# Patient Record
Sex: MALE | Race: WHITE | HISPANIC OR LATINO | Employment: FULL TIME | URBAN - METROPOLITAN AREA
[De-identification: names, ages, dates, MRNs, and addresses within clinical notes are randomized per-mention and may not be internally consistent; named-entity substitution may affect disease eponyms.]

---

## 2023-06-13 ENCOUNTER — OFFICE VISIT (OUTPATIENT)
Dept: PRIMARY CARE | Facility: CLINIC | Age: 38
End: 2023-06-13
Payer: COMMERCIAL

## 2023-06-13 ENCOUNTER — LAB (OUTPATIENT)
Dept: LAB | Facility: LAB | Age: 38
End: 2023-06-13
Payer: COMMERCIAL

## 2023-06-13 VITALS
WEIGHT: 216.6 LBS | OXYGEN SATURATION: 98 % | SYSTOLIC BLOOD PRESSURE: 110 MMHG | BODY MASS INDEX: 25.58 KG/M2 | HEIGHT: 77 IN | DIASTOLIC BLOOD PRESSURE: 68 MMHG | HEART RATE: 55 BPM

## 2023-06-13 DIAGNOSIS — Z00.00 ANNUAL PHYSICAL EXAM: ICD-10-CM

## 2023-06-13 DIAGNOSIS — Z00.00 ANNUAL PHYSICAL EXAM: Primary | ICD-10-CM

## 2023-06-13 LAB
CHOLESTEROL (MG/DL) IN SER/PLAS: 169 MG/DL (ref 0–199)
CHOLESTEROL IN HDL (MG/DL) IN SER/PLAS: 46.6 MG/DL
CHOLESTEROL/HDL RATIO: 3.6
LDL: 109 MG/DL (ref 0–99)
TRIGLYCERIDE (MG/DL) IN SER/PLAS: 65 MG/DL (ref 0–149)
VLDL: 13 MG/DL (ref 0–40)

## 2023-06-13 PROCEDURE — 80061 LIPID PANEL: CPT

## 2023-06-13 PROCEDURE — 36415 COLL VENOUS BLD VENIPUNCTURE: CPT

## 2023-06-13 PROCEDURE — 99395 PREV VISIT EST AGE 18-39: CPT | Performed by: FAMILY MEDICINE

## 2023-06-13 PROCEDURE — 1036F TOBACCO NON-USER: CPT | Performed by: FAMILY MEDICINE

## 2023-06-13 ASSESSMENT — PAIN SCALES - GENERAL: PAINLEVEL: 0-NO PAIN

## 2023-06-13 NOTE — PROGRESS NOTES
"Subjective   Patient ID: David Cummins is a 37 y.o. male who presents for Annual Exam (Annual physical exam, pt is fasting. ).    HPI patient doing well.  He has been running marathons.  His wife is pregnant with their second child.  He admits it has been rough as his first child does not sleep well.  Patient younger brother recently had a heart attack.  Currently but is due to an aberrant coronary artery.    Review of Systems  Cardiovascular: no chest pain, no edema, no palpitations, and no syncope.   Respiratory: no cough,no shortness of breath, and no wheezing  Gastrointestinal: no abdominal pain, nausea, vomiting or blood in stools  Genitourinary: no dysuria or hematuria  Objective   /68 (BP Location: Left arm, Patient Position: Sitting, BP Cuff Size: Adult)   Pulse 55   Ht 1.956 m (6' 5\")   Wt 98.2 kg (216 lb 9.6 oz)   SpO2 98%   BMI 25.69 kg/m²     Physical Exam  General: Alert, pleasant and in no acute distress  HEENT: Extraocular motor intact, PERRLA,  Neck: supple, nontender, no palpable or enlarged nodes, no thyromegaly  Heart: Regular rate and rhythm, no murmurs  Lungs: Clear to auscultation with good air exchange  Chest- symmetric, nontender  Abdomen: Soft, nontender, no palpable mass or organomegaly  Lower extremities: No edema  Skin: No atypical rashes or lesions  Assessment/Plan   Problem List Items Addressed This Visit    None  Visit Diagnoses       Annual physical exam    -  Primary    Relevant Orders    Lipid Panel        Patient doing well.  He is very fit.  We will check a lipid panel.  Patient has a family history of heart disease.  He underwent CT cardiac score and CRP last year which were normal.  Lipids revealed a high HDL and low LDL.  Patient had a abnormality in his stomach on the CT cardiac score.  He saw GI.  They recommend that it would be good to get an endoscopy.  He is going to monitor how things go before he decides.       "

## 2023-06-15 ENCOUNTER — TELEPHONE (OUTPATIENT)
Dept: PRIMARY CARE | Facility: CLINIC | Age: 38
End: 2023-06-15
Payer: COMMERCIAL

## 2023-06-15 NOTE — TELEPHONE ENCOUNTER
----- Message from Ron Abdul DO sent at 6/14/2023  8:04 PM EDT -----  Please let patient know that his cholesterol was excellent at 169.  ----- Message -----  From: Lab, Background User  Sent: 6/13/2023   3:47 PM EDT  To: Ron Adbul DO

## 2023-12-07 ENCOUNTER — TELEPHONE (OUTPATIENT)
Dept: PRIMARY CARE | Facility: CLINIC | Age: 38
End: 2023-12-07
Payer: COMMERCIAL

## 2023-12-07 DIAGNOSIS — J01.90 ACUTE SINUSITIS, RECURRENCE NOT SPECIFIED, UNSPECIFIED LOCATION: Primary | ICD-10-CM

## 2023-12-07 RX ORDER — AMOXICILLIN 500 MG/1
500 CAPSULE ORAL 3 TIMES DAILY
Qty: 30 CAPSULE | Refills: 0 | Status: SHIPPED | OUTPATIENT
Start: 2023-12-07 | End: 2023-12-17

## 2023-12-07 NOTE — TELEPHONE ENCOUNTER
David believes he has a sinus infection, he has had it for about 3 days now and wants to know if you are able to send in ABX to help him out? [facility]  He mentioned his daughter was sick and now he is sick

## 2024-06-05 ENCOUNTER — OFFICE VISIT (OUTPATIENT)
Dept: PRIMARY CARE | Facility: CLINIC | Age: 39
End: 2024-06-05
Payer: COMMERCIAL

## 2024-06-05 VITALS
SYSTOLIC BLOOD PRESSURE: 118 MMHG | DIASTOLIC BLOOD PRESSURE: 68 MMHG | HEART RATE: 102 BPM | TEMPERATURE: 97.8 F | WEIGHT: 217.8 LBS | BODY MASS INDEX: 25.83 KG/M2 | OXYGEN SATURATION: 96 %

## 2024-06-05 DIAGNOSIS — S39.012A ACUTE MYOFASCIAL STRAIN OF LUMBAR REGION, INITIAL ENCOUNTER: Primary | ICD-10-CM

## 2024-06-05 PROCEDURE — 98925 OSTEOPATH MANJ 1-2 REGIONS: CPT | Performed by: FAMILY MEDICINE

## 2024-06-05 PROCEDURE — 1036F TOBACCO NON-USER: CPT | Performed by: FAMILY MEDICINE

## 2024-06-05 PROCEDURE — 99213 OFFICE O/P EST LOW 20 MIN: CPT | Performed by: FAMILY MEDICINE

## 2024-06-05 RX ORDER — CYCLOBENZAPRINE HCL 10 MG
10 TABLET ORAL 3 TIMES DAILY PRN
Qty: 30 TABLET | Refills: 0 | Status: SHIPPED | OUTPATIENT
Start: 2024-06-05 | End: 2024-08-04

## 2024-06-05 ASSESSMENT — PAIN SCALES - GENERAL: PAINLEVEL: 9

## 2024-06-05 ASSESSMENT — ENCOUNTER SYMPTOMS: DEPRESSION: 0

## 2024-06-05 NOTE — PROGRESS NOTES
Subjective   Patient ID: David Cummins is a 38 y.o. male who presents for Back Pain (Ongoing back pain after weight lifting 2 days ago. ).    HPI   Began 3 days ago when working out at gym.  Took it easy and wass fine the rest of day.    No radicular symptoms. Pain radiates to upper buttocks on left  Review of Systems    Objective   /68 (BP Location: Right arm, Patient Position: Sitting, BP Cuff Size: Adult)   Pulse 102   Temp 36.6 °C (97.8 °F) (Temporal)   Wt 98.8 kg (217 lb 12.8 oz)   SpO2 96%   BMI 25.83 kg/m²     Physical Exam  Alert, pleasant and in no acute distress.  Musculoskeletal: Patient with slight forward hypertension questionnaire.  Flattening of the lumbar lordosis.  Mild to moderate diffuse loss of range of motion through the thoracic and lumbar spine.  Assessment/Plan   Problem List Items Addressed This Visit    None  Visit Diagnoses         Codes    Acute myofascial strain of lumbar region, initial encounter    -  Primary S39.012A    Relevant Medications    cyclobenzaprine (Flexeril) 10 mg tablet        Patient here with an acute lumbar strain.  Osteopathic manipulative therapy verbal consent.  HVLA technique to the thoracic and lumbar spine with moderate results.  Discussed importance of therapeutic exercises.  Patient with good understanding.  Advised patient to continue ibuprofen along with cyclobenzaprine prescription I provided.  Patient will follow-up in the next 3 to 5 days if not improving.

## 2025-01-03 ENCOUNTER — APPOINTMENT (OUTPATIENT)
Dept: PRIMARY CARE | Facility: CLINIC | Age: 40
End: 2025-01-03
Payer: COMMERCIAL

## 2025-01-10 ENCOUNTER — APPOINTMENT (OUTPATIENT)
Dept: PRIMARY CARE | Facility: CLINIC | Age: 40
End: 2025-01-10
Payer: COMMERCIAL

## 2025-01-17 ENCOUNTER — APPOINTMENT (OUTPATIENT)
Dept: PRIMARY CARE | Facility: CLINIC | Age: 40
End: 2025-01-17
Payer: COMMERCIAL

## 2025-01-23 ENCOUNTER — APPOINTMENT (OUTPATIENT)
Dept: PRIMARY CARE | Facility: CLINIC | Age: 40
End: 2025-01-23
Payer: COMMERCIAL

## 2025-01-23 VITALS
WEIGHT: 223 LBS | HEIGHT: 76 IN | TEMPERATURE: 97.7 F | BODY MASS INDEX: 27.16 KG/M2 | OXYGEN SATURATION: 98 % | DIASTOLIC BLOOD PRESSURE: 76 MMHG | SYSTOLIC BLOOD PRESSURE: 118 MMHG | HEART RATE: 75 BPM

## 2025-01-23 DIAGNOSIS — Z82.49 FAMILY HISTORY OF CARDIAC DISORDER: ICD-10-CM

## 2025-01-23 DIAGNOSIS — Z00.00 PHYSICAL EXAM, ANNUAL: Primary | ICD-10-CM

## 2025-01-23 PROCEDURE — 99395 PREV VISIT EST AGE 18-39: CPT | Performed by: NURSE PRACTITIONER

## 2025-01-23 PROCEDURE — 80061 LIPID PANEL: CPT

## 2025-01-23 PROCEDURE — 3008F BODY MASS INDEX DOCD: CPT | Performed by: NURSE PRACTITIONER

## 2025-01-23 PROCEDURE — 82306 VITAMIN D 25 HYDROXY: CPT

## 2025-01-23 PROCEDURE — 83036 HEMOGLOBIN GLYCOSYLATED A1C: CPT

## 2025-01-23 PROCEDURE — 93000 ELECTROCARDIOGRAM COMPLETE: CPT | Performed by: NURSE PRACTITIONER

## 2025-01-23 PROCEDURE — 84443 ASSAY THYROID STIM HORMONE: CPT

## 2025-01-23 PROCEDURE — 80053 COMPREHEN METABOLIC PANEL: CPT

## 2025-01-23 PROCEDURE — 1036F TOBACCO NON-USER: CPT | Performed by: NURSE PRACTITIONER

## 2025-01-23 PROCEDURE — 85025 COMPLETE CBC W/AUTO DIFF WBC: CPT

## 2025-01-23 ASSESSMENT — PATIENT HEALTH QUESTIONNAIRE - PHQ9
1. LITTLE INTEREST OR PLEASURE IN DOING THINGS: NEARLY EVERY DAY
2. FEELING DOWN, DEPRESSED OR HOPELESS: NOT AT ALL
SUM OF ALL RESPONSES TO PHQ9 QUESTIONS 1 AND 2: 3

## 2025-01-23 ASSESSMENT — ENCOUNTER SYMPTOMS
CARDIOVASCULAR NEGATIVE: 1
ALLERGIC/IMMUNOLOGIC NEGATIVE: 1
NEUROLOGICAL NEGATIVE: 1
EYES NEGATIVE: 1
GASTROINTESTINAL NEGATIVE: 1
PSYCHIATRIC NEGATIVE: 1
ENDOCRINE NEGATIVE: 1
DEPRESSION: 0
ROS SKIN COMMENTS: SEE HPI
MUSCULOSKELETAL NEGATIVE: 1
CONSTITUTIONAL NEGATIVE: 1
RESPIRATORY NEGATIVE: 1
HEMATOLOGIC/LYMPHATIC NEGATIVE: 1

## 2025-01-23 ASSESSMENT — PAIN SCALES - GENERAL: PAINLEVEL_OUTOF10: 0-NO PAIN

## 2025-01-23 NOTE — PATIENT INSTRUCTIONS
Good Seeing you today.  Healthy diet and Drink plenty of water-UNLESS RESTRICTIONS.  Regular exercises.  Labs drawn today in clinic.  Please see MyChart for results.  Will notify you with abnormal results only by telephone or MyChart.  If you have any question about your labs do not hesitate to give our office a call.    If you do not have access to [employer]t our office will notify you with any abnormal results.  Please schedule any appointments with ophthalmology/dentist if you are not up-to-date.    Follow-up in 1 year sooner.   Call office any new concerns..  Notify office if they have any changes to the lipoma under your right ear.

## 2025-01-23 NOTE — PROGRESS NOTES
"Subjective   Patient ID: David Cummins is a 39 y.o. male who presents for Annual Exam (Patient is fasting. Growth on right ear.).    HPI       Previous patient of Dr. Ron Abdul presents to clinic for annual exam.      Patient denies being treated for any current medical problems currently denies taking any medications.      Patient states he has what he has been told is a lipoma under his right ear.  He states he had a growth for approximately 10 years and it is slowly growing.  He denies pain or discomfort at the site.  Just wanted the area checked.      Patient also has strong family history of cardiac problems both father and brother.  Younger brother had a heart attack a few years ago.       Patient has been followed by cardiology.  Patient had an CT calcium score June 27, 2022 calcium score of 0.  Will order twelve-lead EKG today.  Patient is without chest pain short of breath.  He is asymptomatic.      Patient states he eats a healthy diet very active he runs marathons.     Appetite normal bowel and bladder normal.      Review of Systems   Constitutional: Negative.    HENT: Negative.     Eyes: Negative.    Respiratory: Negative.     Cardiovascular: Negative.    Gastrointestinal: Negative.    Endocrine: Negative.    Genitourinary: Negative.    Musculoskeletal: Negative.    Skin: Negative.         See HPI   Allergic/Immunologic: Negative.    Neurological: Negative.    Hematological: Negative.    Psychiatric/Behavioral: Negative.         Objective   /76 (BP Location: Left arm, Patient Position: Sitting, BP Cuff Size: Adult)   Pulse 75   Temp 36.5 °C (97.7 °F) (Temporal)   Ht 1.93 m (6' 4\")   Wt 101 kg (223 lb)   SpO2 98%   BMI 27.14 kg/m²     Physical Exam  Vitals reviewed.   Constitutional:       Appearance: Normal appearance.   HENT:      Right Ear: Tympanic membrane and ear canal normal.      Left Ear: Tympanic membrane and ear canal normal.      Mouth/Throat:      Mouth: Mucous membranes " are moist.      Pharynx: Oropharynx is clear.   Eyes:      Pupils: Pupils are equal, round, and reactive to light.   Cardiovascular:      Rate and Rhythm: Normal rate and regular rhythm.   Pulmonary:      Effort: Pulmonary effort is normal.      Breath sounds: Normal breath sounds.   Abdominal:      General: Bowel sounds are normal.      Palpations: Abdomen is soft.   Musculoskeletal:         General: Normal range of motion.      Cervical back: Normal range of motion and neck supple.   Skin:     General: Skin is warm and dry.      Comments: Small soft movable growth what appears to be a lipoma under right ear lobe.   Neurological:      General: No focal deficit present.      Mental Status: He is alert and oriented to person, place, and time.   Psychiatric:         Mood and Affect: Mood normal.         Thought Content: Thought content normal.         Assessment/Plan   Diagnoses and all orders for this visit:  Physical exam, annual  -Unremarkable physical exam  -Healthy diet regular exercises  -Follow-up 1 year sooner if needed.  -     CBC and Auto Differential  -     Comprehensive Metabolic Panel  -     TSH with reflex to Free T4 if abnormal  -     Lipid Panel  -     Hemoglobin A1C  -     Vitamin D 25-Hydroxy,Total (for eval of Vitamin D levels)  Family history of cardiac disorder  -     ECG 12 lead (Clinic Performed)-sinus bradycardia rate 51 otherwise normal    Continue to monitor lipoma and refer to dermatology or general surgeon if needed.

## 2025-01-24 LAB
25(OH)D3 SERPL-MCNC: 32 NG/ML (ref 30–100)
ALBUMIN SERPL BCP-MCNC: 4.9 G/DL (ref 3.4–5)
ALP SERPL-CCNC: 32 U/L (ref 33–120)
ALT SERPL W P-5'-P-CCNC: 15 U/L (ref 10–52)
ANION GAP SERPL CALC-SCNC: 14 MMOL/L (ref 10–20)
AST SERPL W P-5'-P-CCNC: 16 U/L (ref 9–39)
BASOPHILS # BLD AUTO: 0.04 X10*3/UL (ref 0–0.1)
BASOPHILS NFR BLD AUTO: 0.7 %
BILIRUB SERPL-MCNC: 0.8 MG/DL (ref 0–1.2)
BUN SERPL-MCNC: 15 MG/DL (ref 6–23)
CALCIUM SERPL-MCNC: 10 MG/DL (ref 8.6–10.6)
CHLORIDE SERPL-SCNC: 103 MMOL/L (ref 98–107)
CHOLEST SERPL-MCNC: 187 MG/DL (ref 0–199)
CHOLESTEROL/HDL RATIO: 4.2
CO2 SERPL-SCNC: 27 MMOL/L (ref 21–32)
CREAT SERPL-MCNC: 1.06 MG/DL (ref 0.5–1.3)
EGFRCR SERPLBLD CKD-EPI 2021: >90 ML/MIN/1.73M*2
EOSINOPHIL # BLD AUTO: 0.28 X10*3/UL (ref 0–0.7)
EOSINOPHIL NFR BLD AUTO: 4.7 %
ERYTHROCYTE [DISTWIDTH] IN BLOOD BY AUTOMATED COUNT: 12.6 % (ref 11.5–14.5)
EST. AVERAGE GLUCOSE BLD GHB EST-MCNC: 108 MG/DL
GLUCOSE SERPL-MCNC: 92 MG/DL (ref 74–99)
HBA1C MFR BLD: 5.4 %
HCT VFR BLD AUTO: 43.1 % (ref 41–52)
HDLC SERPL-MCNC: 44.9 MG/DL
HGB BLD-MCNC: 13.6 G/DL (ref 13.5–17.5)
IMM GRANULOCYTES # BLD AUTO: 0 X10*3/UL (ref 0–0.7)
IMM GRANULOCYTES NFR BLD AUTO: 0 % (ref 0–0.9)
LDLC SERPL CALC-MCNC: 123 MG/DL
LYMPHOCYTES # BLD AUTO: 2.74 X10*3/UL (ref 1.2–4.8)
LYMPHOCYTES NFR BLD AUTO: 45.7 %
MCH RBC QN AUTO: 30.6 PG (ref 26–34)
MCHC RBC AUTO-ENTMCNC: 31.6 G/DL (ref 32–36)
MCV RBC AUTO: 97 FL (ref 80–100)
MONOCYTES # BLD AUTO: 0.53 X10*3/UL (ref 0.1–1)
MONOCYTES NFR BLD AUTO: 8.8 %
NEUTROPHILS # BLD AUTO: 2.41 X10*3/UL (ref 1.2–7.7)
NEUTROPHILS NFR BLD AUTO: 40.1 %
NON HDL CHOLESTEROL: 142 MG/DL (ref 0–149)
NRBC BLD-RTO: 0 /100 WBCS (ref 0–0)
PLATELET # BLD AUTO: 248 X10*3/UL (ref 150–450)
POTASSIUM SERPL-SCNC: 4.6 MMOL/L (ref 3.5–5.3)
PROT SERPL-MCNC: 7.4 G/DL (ref 6.4–8.2)
RBC # BLD AUTO: 4.45 X10*6/UL (ref 4.5–5.9)
SODIUM SERPL-SCNC: 139 MMOL/L (ref 136–145)
TRIGL SERPL-MCNC: 94 MG/DL (ref 0–149)
TSH SERPL-ACNC: 2.26 MIU/L (ref 0.44–3.98)
VLDL: 19 MG/DL (ref 0–40)
WBC # BLD AUTO: 6 X10*3/UL (ref 4.4–11.3)

## 2025-03-07 PROCEDURE — 99283 EMERGENCY DEPT VISIT LOW MDM: CPT | Performed by: STUDENT IN AN ORGANIZED HEALTH CARE EDUCATION/TRAINING PROGRAM

## 2025-03-07 ASSESSMENT — COLUMBIA-SUICIDE SEVERITY RATING SCALE - C-SSRS
1. IN THE PAST MONTH, HAVE YOU WISHED YOU WERE DEAD OR WISHED YOU COULD GO TO SLEEP AND NOT WAKE UP?: NO
6. HAVE YOU EVER DONE ANYTHING, STARTED TO DO ANYTHING, OR PREPARED TO DO ANYTHING TO END YOUR LIFE?: NO
2. HAVE YOU ACTUALLY HAD ANY THOUGHTS OF KILLING YOURSELF?: NO

## 2025-03-08 ENCOUNTER — HOSPITAL ENCOUNTER (EMERGENCY)
Facility: HOSPITAL | Age: 40
Discharge: HOME | End: 2025-03-08
Attending: STUDENT IN AN ORGANIZED HEALTH CARE EDUCATION/TRAINING PROGRAM
Payer: COMMERCIAL

## 2025-03-08 VITALS
OXYGEN SATURATION: 97 % | SYSTOLIC BLOOD PRESSURE: 128 MMHG | BODY MASS INDEX: 26.79 KG/M2 | WEIGHT: 220 LBS | HEART RATE: 62 BPM | HEIGHT: 76 IN | DIASTOLIC BLOOD PRESSURE: 86 MMHG | TEMPERATURE: 97 F | RESPIRATION RATE: 16 BRPM

## 2025-03-08 DIAGNOSIS — M79.661 PAIN IN RIGHT LOWER LEG: ICD-10-CM

## 2025-03-08 DIAGNOSIS — M21.371 RIGHT FOOT DROP: Primary | ICD-10-CM

## 2025-03-08 LAB
ALBUMIN SERPL BCP-MCNC: 4.7 G/DL (ref 3.4–5)
ALP SERPL-CCNC: 42 U/L (ref 33–120)
ALT SERPL W P-5'-P-CCNC: 12 U/L (ref 10–52)
ANION GAP SERPL CALC-SCNC: 13 MMOL/L (ref 10–20)
AST SERPL W P-5'-P-CCNC: 15 U/L (ref 9–39)
BASOPHILS # BLD AUTO: 0.04 X10*3/UL (ref 0–0.1)
BASOPHILS NFR BLD AUTO: 0.5 %
BILIRUB SERPL-MCNC: 0.4 MG/DL (ref 0–1.2)
BUN SERPL-MCNC: 16 MG/DL (ref 6–23)
CALCIUM SERPL-MCNC: 9.4 MG/DL (ref 8.6–10.3)
CHLORIDE SERPL-SCNC: 106 MMOL/L (ref 98–107)
CK SERPL-CCNC: 125 U/L (ref 0–325)
CO2 SERPL-SCNC: 23 MMOL/L (ref 21–32)
CREAT SERPL-MCNC: 0.94 MG/DL (ref 0.5–1.3)
EGFRCR SERPLBLD CKD-EPI 2021: >90 ML/MIN/1.73M*2
EOSINOPHIL # BLD AUTO: 0.2 X10*3/UL (ref 0–0.7)
EOSINOPHIL NFR BLD AUTO: 2.7 %
ERYTHROCYTE [DISTWIDTH] IN BLOOD BY AUTOMATED COUNT: 12.2 % (ref 11.5–14.5)
GLUCOSE SERPL-MCNC: 109 MG/DL (ref 74–99)
HCT VFR BLD AUTO: 38.5 % (ref 41–52)
HGB BLD-MCNC: 13.2 G/DL (ref 13.5–17.5)
IMM GRANULOCYTES # BLD AUTO: 0.02 X10*3/UL (ref 0–0.7)
IMM GRANULOCYTES NFR BLD AUTO: 0.3 % (ref 0–0.9)
LYMPHOCYTES # BLD AUTO: 2.8 X10*3/UL (ref 1.2–4.8)
LYMPHOCYTES NFR BLD AUTO: 37.5 %
MCH RBC QN AUTO: 31.5 PG (ref 26–34)
MCHC RBC AUTO-ENTMCNC: 34.3 G/DL (ref 32–36)
MCV RBC AUTO: 92 FL (ref 80–100)
MONOCYTES # BLD AUTO: 0.63 X10*3/UL (ref 0.1–1)
MONOCYTES NFR BLD AUTO: 8.4 %
NEUTROPHILS # BLD AUTO: 3.77 X10*3/UL (ref 1.2–7.7)
NEUTROPHILS NFR BLD AUTO: 50.6 %
NRBC BLD-RTO: 0 /100 WBCS (ref 0–0)
PLATELET # BLD AUTO: 229 X10*3/UL (ref 150–450)
POTASSIUM SERPL-SCNC: 4.1 MMOL/L (ref 3.5–5.3)
PROT SERPL-MCNC: 7.4 G/DL (ref 6.4–8.2)
RBC # BLD AUTO: 4.19 X10*6/UL (ref 4.5–5.9)
SODIUM SERPL-SCNC: 138 MMOL/L (ref 136–145)
WBC # BLD AUTO: 7.5 X10*3/UL (ref 4.4–11.3)

## 2025-03-08 PROCEDURE — 36415 COLL VENOUS BLD VENIPUNCTURE: CPT

## 2025-03-08 PROCEDURE — 80053 COMPREHEN METABOLIC PANEL: CPT

## 2025-03-08 PROCEDURE — 85025 COMPLETE CBC W/AUTO DIFF WBC: CPT

## 2025-03-08 PROCEDURE — 82550 ASSAY OF CK (CPK): CPT

## 2025-03-08 ASSESSMENT — LIFESTYLE VARIABLES
TOTAL SCORE: 0
HAVE PEOPLE ANNOYED YOU BY CRITICIZING YOUR DRINKING: NO
EVER FELT BAD OR GUILTY ABOUT YOUR DRINKING: NO
HAVE YOU EVER FELT YOU SHOULD CUT DOWN ON YOUR DRINKING: NO
EVER HAD A DRINK FIRST THING IN THE MORNING TO STEADY YOUR NERVES TO GET RID OF A HANGOVER: NO

## 2025-03-08 ASSESSMENT — PAIN - FUNCTIONAL ASSESSMENT: PAIN_FUNCTIONAL_ASSESSMENT: 0-10

## 2025-03-08 ASSESSMENT — PAIN SCALES - GENERAL
PAINLEVEL_OUTOF10: 3
PAINLEVEL_OUTOF10: 0 - NO PAIN

## 2025-03-08 ASSESSMENT — PAIN DESCRIPTION - LOCATION: LOCATION: LEG

## 2025-03-08 NOTE — DISCHARGE INSTRUCTIONS
You were seen in the emergency room for weakness and numbness in your right foot.  I am concerned that you may have damage in the nerve in your right leg.  I would like you to see a sports medicinedoctor as soon as possible   All of your lab work looks normal and we did not find any signs of damage to your muscles      Please follow-up either with sports medicine and or at our walk-in orthopedics clinic which is here at this hospital

## 2025-03-08 NOTE — ED PROVIDER NOTES
"History of Present Illness     History provided by: Patient  Limitations to History: None  External Records Reviewed with Brief Summary: None    HPI:  David Cummins is a 39 y.o. male with no notable PMHx presenting today for pain on the anterior compartment of his right lower leg.  He reports while in the middle of a run (he is training for a marathon) that he developed acute pain over the tibialis anterior muscle, pain is dull in nature.  At the same time he developed symptoms he describes as \"dropfoot\" in which he has had difficulty with dorsiflexion of the RLE and dragging of the right foot     States that pain was poorly controlled with Advil at home.  Tonight he took an oxycodone which was previously prescribed with some benefit for a short time.   He states that his pain has generally been worse at the end of the day after work when he is resting in a chair.  Had exacerbation of pain tonight which awoke him from sleep.  Pain is relieved by placing his feet flat on the floor  Additionally notes decreased sensation over the right hallux     Pain is not exacerbated by movement    Denies any recent illness, denies back pain, fevers, has never used injection drugs, no bowel or bladder incontinence, denies trauma to the back and the leg.       Physical Exam   Triage vitals:  T 36.8 °C (98.2 °F)  HR 71  /65  RR 16  O2 97 %        Legs: No skin changes, perhaps mild swelling over the right lower leg and the anterior compartment.  No pain on palpation, no erythema, is not indurated or warm.  Decreased strength with dorsiflexion in the RLE at the ankle compared to the left.  Plantarflexion, inversion, eversion are 5/5 and equal bilaterally    Decreased but intact sensation of the right hallux.  Normal sensation through the rest of the feet and toes bilaterally.  Capillary refill less than 2 seconds throughout  DPs intact bilaterally    No tenderness through the back or in the midline bony spine.  No step-offs " in the spine.  No pain over the hips.  Strength and sensation fully intact in the thighs and with flexion/extension of the hips and knees as well as abduction and adduction at the hips    +2 reflexes in the knees.  1 beat of clonus at the ankles bilaterally.  No Babinski bilaterally    Medical Decision Making & ED Course   Medical Decision Makin y.o. male with history and physical concerning for dropfoot, there are no signs of central spinal process or changes in any neurologic function above the knee.  He has symptoms purely motor in the tibialis anterior and purely sensory over the medial foot.  He has no other concerning signs or symptoms and has not been ill recently.  Has not had any traumatic injuries nor worn any braces or casts.  It is unclear to me how this patient developed this neurologic syndrome but appears to have a purely peripheral neuropathy which is caused dropfoot in this patient.  He will require further evaluation, I will refer patient to Ortho clinic and to sports medicine, whoever can see the patient first is ideal so that he can begin the healing process sooner.  I suspect that he may ultimately need an EMG to evaluate nerve injury and to guide recovery    Additionally he has no bowel or bladder symptoms/incontinence so I have an extremely low concern for cauda equina as patient does also not have any symptoms on the other foot    ED Course:  Diagnoses as of 25 0800   Right foot drop   Pain in right lower leg         ---     Social Determinants of Health which Significantly Impact Care: None identified     EKG Independent Interpretation: EKG not obtained    The patient was discussed with the following consultants/services: None    Independent Result Review and Interpretation: Relevant laboratory and radiographic results were reviewed and independently interpreted by myself.  As necessary, they are commented on in the ED Course.    Chronic conditions affecting the patient's care:  As documented above in MDM    Care Considerations: As documented above in MDM    Disposition   As a result of the work-up, the patient was discharged home.  he was informed of his diagnosis and instructed to come back with any concerns or worsening of condition.  he and was agreeable to the plan as discussed above.  he was given the opportunity to ask questions.  All of the patient's questions were answered.    Procedures   Procedures    This was a shared visit with an ED attending.  The patient was seen and discussed with the ED attending    Robbie Cotton MD  Emergency Medicine     Robbie Cotton MD  Resident  03/09/25 [id number]

## 2025-03-08 NOTE — ED NOTES
Examined patients right leg and calf; patients pain appears to be on lateral side of shin radiating down to ankle; patient has good pulses and no obvious swelling; patient denies trauma; patient states pain is a dull ache and is worse at night; patient is unable to dorsiflex right foot and he states that started about 3 days ago with the leg pain; patient has no other complaints     Awa Cantrell RN  03/08/25 4710

## 2025-03-27 ENCOUNTER — OFFICE VISIT (OUTPATIENT)
Dept: ORTHOPEDIC SURGERY | Facility: CLINIC | Age: 40
End: 2025-03-27
Payer: COMMERCIAL

## 2025-03-27 ENCOUNTER — HOSPITAL ENCOUNTER (OUTPATIENT)
Dept: RADIOLOGY | Facility: CLINIC | Age: 40
Discharge: HOME | End: 2025-03-27
Payer: COMMERCIAL

## 2025-03-27 DIAGNOSIS — M21.371 RIGHT FOOT DROP: Primary | ICD-10-CM

## 2025-03-27 DIAGNOSIS — M79.671 RIGHT FOOT PAIN: ICD-10-CM

## 2025-03-27 DIAGNOSIS — M79.661 PAIN IN RIGHT LOWER LEG: ICD-10-CM

## 2025-03-27 PROCEDURE — 73630 X-RAY EXAM OF FOOT: CPT | Mod: RT

## 2025-03-27 PROCEDURE — 99204 OFFICE O/P NEW MOD 45 MIN: CPT | Performed by: ORTHOPAEDIC SURGERY

## 2025-03-27 PROCEDURE — 99214 OFFICE O/P EST MOD 30 MIN: CPT | Performed by: ORTHOPAEDIC SURGERY

## 2025-03-27 PROCEDURE — 1036F TOBACCO NON-USER: CPT | Performed by: ORTHOPAEDIC SURGERY

## 2025-03-27 ASSESSMENT — PAIN SCALES - GENERAL: PAINLEVEL_OUTOF10: 3

## 2025-03-27 ASSESSMENT — PAIN - FUNCTIONAL ASSESSMENT: PAIN_FUNCTIONAL_ASSESSMENT: 0-10

## 2025-03-27 ASSESSMENT — PAIN DESCRIPTION - DESCRIPTORS: DESCRIPTORS: TINGLING;OTHER (COMMENT)

## 2025-03-27 NOTE — PROGRESS NOTES
ORTHOPAEDIC SURGERY CLINIC NOTE    Patient Name: David Cummins   MRN: [id number]    PRIMARY CARE PHYSICIAN: Lola Wilson, CHRYSTAL-KENYATTA    CHIEF COMPLAINT:  R ankle weakness, RLE pain    HPI: David Cummins is a 39 y.o. male with a PMH healthy who presents with the above chief complaint.    He is a healthy active male.  He has been training for marathon over the past year.  He has done 3 prior marathons.  3 weeks ago he noticed pain along the anterior lateral distal leg.  He also noticed dropfoot.  As result of this he presented to the emergency department.  He does note a history approximately 1 year ago low back injury but denies radicular pain from that episode.  He did some physical therapy related to that.  He has not done any physical therapy related to his right foot.  He has never had an injection.    No diabetes  No blood thinners    PAST MEDICAL HISTORY:    History reviewed. No pertinent past medical history.    PAST SURGICAL HISTORY:   Past Surgical History:   Procedure Laterality Date    OTHER SURGICAL HISTORY  04/14/2016    History Of Prior Surgery       FAMILY HISTORY:    Not contributory to surgical management of this patient   No family history on file.    SOCIAL HISTORY:    Social History     Occupational History    Not on file   Tobacco Use    Smoking status: Never    Smokeless tobacco: Never   Vaping Use    Vaping status: Never Used   Substance and Sexual Activity    Alcohol use: Yes    Drug use: Never    Sexual activity: Not on file       MEDICATIONS:          ALLERGIES: No Known Allergies    COMPLETE REVIEW OF SYSTEMS:    Skin: negative    Eyes: negative review of symptoms    Ears/Nose/Throat: negative    Respiratory: negative symptoms (no cough, hemoptysis, SOB, STOLL, PND, wheezing)    Cardiovascular: negative symptoms (No CP/Pressure/Tightness, palpitations, orthopnea, PND, SOB, STOLL, edema, HA or vision change)    Gastrointestinal: negative symptoms (no abdominal pain, anorexia, n/v,  indigestion, constipation, or diarrhea)      Neurologic: negative symptoms (no syncope, seizures, weakness, gait problems, numbness, burning pain, tremors, or memory loss)    negative (no arthritic pain, no joint swelling, no muscle weakness)    Psychiatric: negative (no sleep disturbance, anxiety, memory loss, disorientation, inattention,  feelings of depression)    Hematologic/Lymphatic/Immunologic: negative (no anemia, bleeding, bruising)       PHYSICAL EXAM:     OBJECTIVE:     GEN: AOx3  CV: RRR to palpation  PULM: non-labored breathing  MSK:  Skin intact  +TTP anterolateral lower leg  +TTP around fibular head  3/5 DF and EHL  4/5 inversion and eversion  5/5 PF  TA jennifer to DF without palpable tear  +tinel DPN  Neg tinel SPN  2+ DP pulse    DATA:   Radiology: XR R foot independently reviewed and interpreted demonstrates no acute fracture or dislocation       ASSESSMENT: David Cummins is a 39 y.o. male with R drop foot, c/f peroneal nerve injury    PLAN:    - EMG ordered  - AFO rx provided  - Weightbearing: WBAT RLE  - Follow up: after EMG    Chi Prakash MD  Orthopaedic Surgery PGY-3

## 2025-03-27 NOTE — PROGRESS NOTES
Patient was reviewed and discussed with SCOTTIE and/or orthopedic resident.  Patient was seen and evaluated in conjunction with SCOTTIE and/or orthopedic resident. Findings and treatment plan were discussed and approved by myself, Dr. Chow.    Exam: ?  Tinel's over DPN.  Tender over fibular head and proximal anterior compartment.  Intact anterior tib and EHL function but unable to dorsiflex against gravity.  4/5 peroneals to resisted eversion on right.    I personally reviewed the following radiographic exams: Right foot unremarkable.    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    Plan: Discussed nonoperative and operative options in detail.   Risk and benefits discussed in detail. All questions answered today.  Recovery timeline and expectations discussed in detail.  Recommend obtaining AFO for mechanical support.  Needs EMG/nerve conduction study to evaluate function of the peroneal nerve and source of dysfunction.  Will consider advanced imaging after reviewing EMG findings.

## 2025-04-03 ENCOUNTER — APPOINTMENT (OUTPATIENT)
Dept: ORTHOPEDIC SURGERY | Facility: CLINIC | Age: 40
End: 2025-04-03
Payer: COMMERCIAL

## 2025-04-03 DIAGNOSIS — M21.371 RIGHT FOOT DROP: ICD-10-CM

## 2025-04-03 DIAGNOSIS — G57.31 PERONEAL NEUROPATHY, RIGHT: Primary | ICD-10-CM

## 2025-04-03 RX ORDER — GABAPENTIN 300 MG/1
CAPSULE ORAL
Qty: 90 CAPSULE | Refills: 3 | Status: SHIPPED | OUTPATIENT
Start: 2025-04-03

## 2025-04-03 ASSESSMENT — PAIN - FUNCTIONAL ASSESSMENT: PAIN_FUNCTIONAL_ASSESSMENT: 0-10

## 2025-04-03 NOTE — PROGRESS NOTES
PM&R  / Ortho clinic eval:    IMPRESSION:    Right common peroneal neuropathy, possibly from local trauma rolling?  Or exertional compartment syndrome?    This really does not look like a spine problem    RECOMMENDATIONS:  -Agree with EMG and custom AFO as ordered  -Meantime he can try [place] - Foot up type strap/brace  -Advised to limit physical activity is much as possible, he could crosstraining with swimming or upper body lifting, maybe even bicycling and use caution  -Prescribed gabapentin up to 300 3 times daily  f/u next available/4 weeks, meanwhile I will watch for EMG results and may discuss with one of our running medicine specialists about exertional pressure managements have him see have him see Hernesto Kelsey in neurosurgery also.     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  Here for  right foot drop since first few days of march - was sore after a run (3rd day in a row) so did some therapeutic rolling (hard plastic rolling device ) over that area - felt good.  Then normal long runs ~6miles rest of that week then suddening very severe pain 2 miles in to run and had to walk home, Pain got even worse and had to go to ED 2 days later.  He really was just having pain then, does not recall having very tense firm pressure in the leg, nor other associated symptoms at that time.  He had a second episode a week or 2 later where he forgot his phone in the car when he was getting on a plane and had to run all  the way back to the parking area and then back into the airport and pain was excruciating so benefit like he was get a pass out on the plane.    The pain is still moderate and crampy, feels like occasional shooting, stinging,  increases with nothing in particular and keeps him awake a night and is relieved by nothing.    Pain scale  6-7/10 on average and 10/10 worst pain in past week.      Played wide  at college, does not recall any major trauma to the back or legs, did have severe low back pain last May after the [employer] but was able to work through it, none recent.  There is no pain that shoots down from the back.    Patient reports no fevers, chills, sweats, night pain, weight loss or cancer history .    Pertinent Physical Exam:  MSK: Lumbar range of motion is mildly reduced in extension but otherwise full and nonprovocative facet loading/foraminal compression maneuvers negative straight leg raise negative hip provocative testing range of motion is unremarkable.  Right knee exam is normal full range of motion no effusion, no tenderness over fibular head  Right shin does have tenderness over the proximal aspect of the tibialis anterior and somewhat diffusely through the anterior compartment especially distal/musculotendinous junction below skilled nursing down the shin over tib anterior.  Compartment does feel soft and supple  Neuro: 3/5 strength in right ankle dorsiflexion and EHL, 4/5 eversion, normal inversion and the rest of the lower extremity is strong.  Sensation is altered but present to light touch in the anterior lateral leg and dorsal foot including the first webspace.    SUPPORTING DOCUMENTATION (remaining history, exam, other findings):  Work-up reviewed - this has included Rt foot xrays neg    Treatment has included none/rest    See above for Assessment and Plan

## 2025-04-03 NOTE — LETTER
April 3, 2025     Lola Wilson, APRN-CNP  [address] [facility]  [facility] [facility] [address]    Patient: David Cummins   YOB: 1985   Date of Visit: 4/3/2025       Dear Dr. Lola Wilson, APRN-CNP:    Thank you for referring David Cummins to me for evaluation. Below are my notes for this consultation.  If you have questions, please do not hesitate to call me. I look forward to following your patient along with you.       Sincerely,     Ron Marte MD      CC: Robbie Cotton MD  ______________________________________________________________________________________    PM&R  / Ortho clinic eval:    IMPRESSION:    Right common peroneal neuropathy, possibly from local trauma rolling?  Or exertional compartment syndrome?    This really does not look like a spine problem    RECOMMENDATIONS:  -Agree with EMG and custom AFO as ordered  -Meantime he can try Ossur - Foot up type strap/brace  -Advised to limit physical activity is much as possible, he could crosstraining with swimming or upper body lifting, maybe even bicycling and use caution  -Prescribed gabapentin up to 300 3 times daily  f/u next available/4 weeks, meanwhile I will watch for EMG results and may discuss with one of our running medicine specialists about exertional pressure managements have him see have him see Hernesto Kelsey in neurosurgery also.     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  Here for  right foot drop  since first few days of march - was sore after a run (3rd day in a row) so did some therapeutic rolling (hard plastic rolling device ) over that area - felt good.  Then normal long runs ~6miles rest of that week then suddening very severe pain 2 miles in to run and had to walk home, Pain got even worse and had to go to ED 2 days later.  He really was just having pain then, does not recall having very tense firm pressure in the leg, nor other associated symptoms at that time.  He had a second episode a week or 2 later where he forgot his phone in the car when he was getting on a plane and had to run all the way back to the parking area and then back into the airport and pain was excruciating so benefit like he was get a pass out on the plane.    The pain is still moderate and crampy, feels like occasional shooting, stinging,  increases with nothing in particular and keeps him awake a night and is relieved by nothing.    Pain scale  6-7/10 on average and 10/10 worst pain in past week.      Played wide  at college, does not recall any major trauma to the back or legs, did have severe low back pain last May after the [employer] marathon but was able to work through it, none recent.  There is no pain that shoots down from the back.    Patient reports no fevers, chills, sweats, night pain, weight loss or cancer history .    Pertinent Physical Exam:  MSK: Lumbar range of motion is mildly reduced in extension but otherwise full and nonprovocative facet loading/foraminal compression maneuvers negative straight leg raise negative hip provocative testing range of motion is unremarkable.  Right knee exam is normal full range of motion no effusion, no tenderness over fibular head  Right shin does have tenderness over the proximal aspect of the tibialis anterior and somewhat diffusely through the anterior compartment especially distal/musculotendinous junction below FDC down the shin over tib anterior.  Compartment does  feel soft and supple  Neuro: 3/5 strength in right ankle dorsiflexion and EHL, 4/5 eversion, normal inversion and the rest of the lower extremity is strong.  Sensation is altered but present to light touch in the anterior lateral leg and dorsal foot including the first webspace.    SUPPORTING DOCUMENTATION (remaining history, exam, other findings):  Work-up reviewed - this has included Rt foot xrays neg    Treatment has included none/rest    See above for Assessment and Plan

## 2025-04-07 ENCOUNTER — HOSPITAL ENCOUNTER (OUTPATIENT)
Dept: NEUROLOGY | Facility: CLINIC | Age: 40
Discharge: HOME | End: 2025-04-07
Payer: COMMERCIAL

## 2025-04-07 DIAGNOSIS — M21.371 RIGHT FOOT DROP: ICD-10-CM

## 2025-04-07 PROCEDURE — 95886 MUSC TEST DONE W/N TEST COMP: CPT | Performed by: PSYCHIATRY & NEUROLOGY

## 2025-04-07 PROCEDURE — 95911 NRV CNDJ TEST 9-10 STUDIES: CPT | Performed by: PSYCHIATRY & NEUROLOGY

## 2025-05-01 ENCOUNTER — HOSPITAL ENCOUNTER (OUTPATIENT)
Dept: RADIOLOGY | Facility: EXTERNAL LOCATION | Age: 40
Discharge: HOME | End: 2025-05-01

## 2025-05-01 ENCOUNTER — APPOINTMENT (OUTPATIENT)
Dept: ORTHOPEDIC SURGERY | Facility: CLINIC | Age: 40
End: 2025-05-01
Payer: COMMERCIAL

## 2025-05-01 DIAGNOSIS — G57.31 PERONEAL NEUROPATHY, RIGHT: Primary | ICD-10-CM

## 2025-05-01 PROCEDURE — 64450 NJX AA&/STRD OTHER PN/BRANCH: CPT | Performed by: PHYSICAL MEDICINE & REHABILITATION

## 2025-05-01 PROCEDURE — 99214 OFFICE O/P EST MOD 30 MIN: CPT | Performed by: PHYSICAL MEDICINE & REHABILITATION

## 2025-05-01 PROCEDURE — 76942 ECHO GUIDE FOR BIOPSY: CPT | Performed by: PHYSICAL MEDICINE & REHABILITATION

## 2025-05-01 ASSESSMENT — PAIN - FUNCTIONAL ASSESSMENT: PAIN_FUNCTIONAL_ASSESSMENT: 0-10

## 2025-05-01 ASSESSMENT — PAIN SCALES - GENERAL: PAINLEVEL_OUTOF10: 7

## 2025-05-01 NOTE — PROGRESS NOTES
PM&R  / Ortho clinic eval:    IMPRESSION:    Right common peroneal neuropathy, possibly from local trauma rolling?   Very unlikely exertional compartment syndrome given location of nerve   ?  Possibly related to old knee injury with football, maybe he has some scar in that area that was compressing the nerve adjacent to the distal hamstring      RECOMMENDATIONS:  - Reviewed EMG -common peroneal neuropathy at fibular head no other abnormality, significant axonal involvement   -We did ultrasound-guided peroneal nerve block after brief diagnostic assessment today.  There is significant swelling to the nerve with 0.35 cm² cross-sectional area just behind the fibular head, and this corresponds with the area of greatest tenderness.  He does have very large fascicles versus intraneural fluid collection but no hyperemia, no ganglion or mass lesion noted around the nerve.  - Nerve Block    Date/Time: 5/1/2025 12:46 PM    Performed by: Ron Marte MD  Authorized by: Ron Marte MD    Consent:     Consent obtained:  Verbal    Consent given by:  Patient    Risks, benefits, and alternatives were discussed: yes    Universal protocol:     Procedure explained and questions answered to patient or proxy's satisfaction: yes      Relevant documents present and verified: yes      Test results available: yes      Imaging studies available: yes      Required blood products, implants, devices, and special equipment available: yes      Site/side marked: yes      Immediately prior to procedure, a time out was called: yes      Patient identity confirmed:  Verbally with patient  Indications:     Indications:  Pain relief  Location:     Body area:  Lower extremity    Lower extremity nerve:  Superficial peroneal    Laterality:  Right  Pre-procedure details:     Skin preparation:  Chlorhexidine    Preparation: Patient was prepped and draped in usual sterile fashion    Procedure details:     Block needle gauge:  25 G    Anesthetic  injected:  Lidocaine 1% w/o epi    Steroid injected:  Triamcinolone  Post-procedure details:     Procedure completion:  Tolerated well, no immediate complications      - Still could proceed with custom AFO and Ossur - Foot up type strap/brace but he is getting by with more hightop shoes lately  - Will await neuromuscular ultrasound and message Dr. Montano again to try to expedite.  Meanwhile we will get MRI right knee to help rule out underlying structural cause and further characterize the peroneal nerve  -Schedule neurosurgery consultation with Dr. Hernesto Kelsey  -Advised continue could crosstraining with swimming or upper body lifting, maybe even bicycling and use caution  - Increase gabapentin up to 600 mg at night and 300   f/u next available/4 weeks, meanwhile I will watch for EMG results and may discuss with one of our running medicine specialists about exertional pressure managements have him see have him see Hernesto Kelsey in neurosurgery also.     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  Here for  right foot drop since first few days of march     Here for EMG results follow-up for right foot drop.  I confirmed the history below.  Additional history he had a knee injury in college and that side landed with the knee in valgus and had significant amount of swelling and some type of soft tissue nodule that he was able to workout and move with his own manual manipulation/massage.    Recall,- was sore after a run (3rd day in a row) so did some therapeutic rolling (hard  plastic rolling device ) over that area - felt good.  Then normal long runs ~6miles rest of that week then suddening very severe pain 2 miles in to run and had to walk home, Pain got even worse and had to go to ED 2 days later.  He really was just having pain then, does not recall having very tense firm pressure in the leg, nor other associated symptoms at that time.  He had a second episode a week or 2 later where he forgot his phone in the car when he was getting on a plane and had to run all the way back to the parking area and then back into the airport and pain was excruciating so benefit like he was get a pass out on the plane.    The pain is still moderate and crampy, feels like occasional shooting, stinging,  increases with nothing in particular and keeps him awake a night and is relieved by nothing.    Pain scale  6-7/10 on average and 10/10 worst pain in past week.      Played wide  at college, does not recall any major trauma to the back or legs, did have severe low back pain last May after the [employer] but was able to work through it, none recent.  There is no pain that shoots down from the back.    Patient reports no fevers, chills, sweats, night pain, weight loss or cancer history .    Pertinent Physical Exam:  MSK: Right knee exam is normal full range of motion no effusion, no tenderness over fibular head, tender behind tibial head over course of, peroneal nerve   Right shin does have tenderness over the proximal aspect of the tibialis anterior and somewhat diffusely through the anterior compartment especially distal/musculotendinous junction below care home down the shin over tib anterior.  Compartment does feel soft and supple  Neuro: 3/5 strength in right ankle dorsiflexion and EHL, 4/5 eversion, normal inversion and the rest of the lower extremity is strong.  Sensation is altered but present, somewhat dysesthetic to light touch in the anterior lateral leg and dorsal foot including  the first webspace.    SUPPORTING DOCUMENTATION (remaining history, exam, other findings):  Work-up reviewed - this has included Rt foot xrays neg.  EMG    Treatment has included gabapentin    See above for Assessment and Plan

## 2025-05-01 NOTE — PATIENT INSTRUCTIONS
Neurosurgery - Moses Kelsey  Neurology - neuromuscular ultrasound - Yovani Charmaine    MRI - [phone]

## 2025-05-12 ENCOUNTER — APPOINTMENT (OUTPATIENT)
Dept: RADIOLOGY | Facility: CLINIC | Age: 40
End: 2025-05-12
Payer: COMMERCIAL

## 2025-05-12 DIAGNOSIS — G57.31 PERONEAL NEUROPATHY, RIGHT: ICD-10-CM

## 2025-05-12 PROCEDURE — 73721 MRI JNT OF LWR EXTRE W/O DYE: CPT | Mod: RIGHT SIDE | Performed by: RADIOLOGY

## 2025-05-12 PROCEDURE — 73721 MRI JNT OF LWR EXTRE W/O DYE: CPT | Mod: RT

## 2025-05-20 ENCOUNTER — PROCEDURE VISIT (OUTPATIENT)
Dept: NEUROLOGY | Facility: HOSPITAL | Age: 40
End: 2025-05-20
Payer: COMMERCIAL

## 2025-05-20 DIAGNOSIS — M21.371 RIGHT FOOT DROP: Primary | ICD-10-CM

## 2025-05-20 PROCEDURE — 76882 US LMTD JT/FCL EVL NVASC XTR: CPT | Mod: LT,GC,59 | Performed by: PSYCHIATRY & NEUROLOGY

## 2025-05-20 NOTE — PROGRESS NOTES
NEUROMUSCULAR ULTRASOUND OF THE RIGHT LOWER EXTREMITY    INDICATION:  Clinical information: In early March 2025, was training for a marathon when over ~2 days the patient started to notice pain that shot down from his lateral knee down the anterolateral calf. During this time, he also noted weakness with ankle dorsiflexion and eversion. Currently reports numbness in the top and lateral side of right foot and lateral aspect of calf. Underwent nerve block at the fibular head on 5/1/25 .    Neuromuscular ultrasound to be performed:    (a) to evaluate any echotexture change or abnormal enlargement of the right common peroneal nerve throughout its course in the distal thigh to the area of the fibular neck to more precisely localize the lesion;  (b) to assess for any identifiable persistent mechanical source of right peroneal nerve compression;  (c) to confirm the nerve is in continuity;   (d) to compare the nerve side to side, and muscles side to side supplied by the peroneal and tibial nerves.    HEIGHT: 6 ft 4 in  WEIGHT: 222 lbs.    COMPARISON:   None.    TECHNIQUE:  The right peroneal nerve was visualized throughout its entire anatomic course in the limb with accompanying structures from the popliteal fossa into the sciatic nerve in the thigh, and distally to its divisions into the deep and superficial branches, including real-time cine imaging. On the contralateral side, a limited exam of peroneal and tibial innervated muscles was performed.  The study was performed using a [employer] P9 ultrasound machine with a 15-6 MHz matrix linear transducer. Muscles supplied by the common peroneal and tibial nerves were compared side to side. The patient was placed prone with the legs slightly abducted. Cross sectional area (CSA) was measured within the epineurium, using the trace method. At locations where repeat measurements were taken, the mean value is reported below.    FINDINGS:  On the affected right side, the sciatic nerve was  seen in the distal thigh. The maximal right sciatic CSA was 59.1 mm2 (NL < 81 mm2). The echogenicity was normal. In the lateral popliteal fossa, the right sciatic nerve was seen bifurcating into the common peroneal and tibial nerves. The right tibial nerve was larger than the peroneal. In the lateral popliteal fossa, the right tibial nerve had a maximal CSA of 14.7 mm2 (NL < 56). The echogenicity was normal.     The right common peroneal nerve was followed in the lateral popliteal fossa down to the fibular head. Adjacent to the fibular head, there was enlargement of the right common peroneal nerve. Slightly further distal, there was a hypoechoic mass with a CSA of 18.63 mm2. This mass was non-compressible, was negative on color doppler, and had posterior acoustic enhancement. At this location, the right common peroneal nerve was displaced by the mass more superficially. At this location, the right common peroneal nerve CSA was 13.79 mm2. This hypoechoic mass continued distally, traveling with the right common peroneal nerve down towards to the fibular neck. On long-axis, there continued to be a fusiform, scalloped lobulated, cystic mass with posterior acoustic enhancement just deep to the peroneal nerve with likely connection to the joint deep to the mass and nerve. 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    Color Doppler of the right peroneal nerve showed normal peroneal nerve vascularity.    Attention was then turned to the left side for comparison and to assess for bilateral involvement. The left sciatic nerve was seen in the distal thigh. The maximal left sciatic CSA was 48.3 mm2 (NL < 81 mm2). The echogenicity was normal. There was no significant difference between the left and right sides (NL side to side difference < 20 mm2). In the lateral popliteal fossa, the left sciatic  nerve was seen bifurcating into the common peroneal and tibial nerves.    In the lateral popliteal fossa, the left common peroneal nerve had a maximal CSA of 6.5 mm2 (NL < 21). The echogenicity was normal. Adjacent to the fibular head, the maximal left common peroneal CSA was 7.2 mm2 (NL < 18). The echogenicity was normal.    No abnormal mass, extraneural or intraneural ganglia was appreciated.    Next, muscles supplied by the common peroneal and tibial nerves were compared side to side for muscle thickness and echogenicity.    There was a slight increase in echogenicity in the right tibialis anterior when compared to the left tibialis anterior muscle. The thickness of the right and left tibialis anterior muscles was symmetric.    The right extensor digitorum longus, peroneus longus, peroneus brevis, and medial gastrocnemius muscles were normal in size and echogenicity.    IMPRESSION:  This is an abnormal neuromuscular ultrasound examination of the right lower upper extremity with a normal contralateral limited study on the left.    Along the course of the right peroneal nerve from the lateral popliteal fossa to the fibular neck, there was a hypoechoic, non-compressible, non-vascular, fusiform, scalloped mass displacing the peroneal nerve superficially. 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 There was only mild increased echogenicity of the right tibialis anterior muscle with normal thickness as compared to the contralateral side. The common peroneal nerve was otherwise normal in the popliteal fossa as it progressed to the sciatic nerve proximally in the posterior thigh. The sciatic nerve was followed through its anatomic course and was normal.    The other visualized osseous, ligamentous and tendinous structures appeared normal, including the contralateral peroneal nerve.     Performed by: Angel Luis Traylor MD  Authorized by: Angel Luis RAND  MD [place]

## 2025-05-27 ENCOUNTER — APPOINTMENT (OUTPATIENT)
Dept: NEUROSURGERY | Facility: CLINIC | Age: 40
End: 2025-05-27
Payer: COMMERCIAL

## 2025-05-27 DIAGNOSIS — G57.31 PERONEAL NEUROPATHY, RIGHT: ICD-10-CM

## 2025-05-27 DIAGNOSIS — M67.40 GANGLION CYST: Primary | ICD-10-CM

## 2025-05-27 PROCEDURE — 99204 OFFICE O/P NEW MOD 45 MIN: CPT | Performed by: STUDENT IN AN ORGANIZED HEALTH CARE EDUCATION/TRAINING PROGRAM

## 2025-05-27 PROCEDURE — 1036F TOBACCO NON-USER: CPT | Performed by: STUDENT IN AN ORGANIZED HEALTH CARE EDUCATION/TRAINING PROGRAM

## 2025-05-27 NOTE — PROGRESS NOTES
[facility]  Department of Neurological Surgery  New Patient Visit    History of Present Illness:  David Cummins is a 39 y.o. year old male who presents with acute RIGHT foot drop.    He was referred by Dr. Ron Marte.  I requested a NMUS and EMG at .     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  He had some vague pain in his right leg and then while running was unable to dorsiflex his right foot and this was followed by more pain.  His symptoms have been relatively stable since.  He trialed a chiropractor who performed massage without any improvement.    14/14 systems reviewed and negative other than what is listed in the history of present illness    Problem List[1]  Medical History[2]  Surgical History[3]  Social History     Tobacco Use    Smoking status: Never    Smokeless tobacco: Never   Substance Use Topics    Alcohol use: Yes     family history is not on file.  Current Medications[4]  Allergies[5]    Physical Examination    General: Well developed, awake/alert/oriented x3, no distress, alert and cooperative    Motor Strength:     Focused: RIGHT     DF - 2 : limited ROM   PF 5  EHL/ TE - 2 limited ROM  EVERSION: 5   INVERSION: 5    Muscle Bulk: Normal and symmetric in all extremities    Sensation: diminished sensation & pain from anterolateral knee to dorsum of foot    Results    I personally reviewed and interpreted the imaging results which included:    MR KNEE 5/12/25  IMPRESSION:  Fusiform long segment edema and thickening of the common peroneal nerve at the level of the knee. Some cystic changes are suggested with the near the level of the fibular head. Findings could suggest cystic degeneration. Some fatty atrophy and edema of the anterior compartment musculature suggests component of peroneal denervation.      Small tear of the medial meniscus.      Mild medial and patellofemoral arthrosis.      NMUS [place]  5/20/25    IMPRESSION:  This is an abnormal neuromuscular ultrasound examination of the right lower upper extremity with a normal contralateral limited study on the left.     Along the course of the right peroneal nerve from the lateral popliteal fossa to the fibular neck, there was a hypoechoic, non-compressible, non-vascular, fusiform, scalloped mass displacing the peroneal nerve superficially. 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 There was only mild increased echogenicity of the right tibialis anterior muscle with normal thickness as compared to the contralateral side. The common peroneal nerve was otherwise normal in the popliteal fossa as it progressed to the sciatic nerve proximally in the posterior thigh. The sciatic nerve was followed through its anatomic course and was normal.     The other visualized osseous, ligamentous and tendinous structures appeared normal, including the contralateral peroneal nerve.     EMG Davis 4/7/25    This is an abnormal study.  There is electrophysiologic evidence consistent with a right peroneal neuropathy across the fibular neck.  The pathophysiology is predominantly demyelination with extensive secondary axonal loss.     In addition, there was no electrophysiologic evidence of sciatic neuropathy, lumbosacral plexopathy or radiculopathy in the right lower extremity.    Assessment and Plan:    David Cummins is a 39 y.o. 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     Plan for resection of the articular branch of the CPN and fenestration of the cyst for peroneal nerve decompression, possible tibiofibular joint resection.       I have reviewed all prior documentation and reviewed the electronic medical record since admission. I have personally have reviewed all advanced imaging not just the  reports and used my interpretation as documented as the relevant findings. I have reviewed the risks and benefits of all treatment recommendations listed in this note with the patient and family.       The above clinical summary has been dictated with voice recognition software. It has not been proofread for grammatical errors, typographical mistakes, or other semantic inconsistencies.    Thank you for visiting our office today. It was our pleasure to take part in your healthcare.     Do not hesitate to call with any questions regarding your plan of care after leaving at [phone] M-F 8am-4pm.     To clinicians, thank you very much for this kind referral. It is a privilege to partner with you in the care of your patients. My office would be delighted to assist you with any further consultations or with questions regarding the plan of care outlined. Do not hesitate to call the office or contact me directly.       Sincerely,      Hernesto Kelsey MD, PhD  Attending Neurosurgeon, [facility]   of Neurological Surgery  [facility] [employer]  Office: [phone]  Fax: [phone]    [facility]  [address]  [address] [place] [address]                 [1] There is no problem list on file for this patient.   [2] History reviewed. No pertinent past medical history.  [3]   Past Surgical History:  Procedure Laterality Date    OTHER SURGICAL HISTORY  04/14/2016    History Of Prior Surgery   [4]   Current Outpatient Medications:     cyclobenzaprine (Flexeril) 10 mg tablet, Take 1 tablet (10 mg) by mouth 3 times a day as needed for muscle spasms. (Patient not taking: Reported on 1/23/2025), Disp: 30 tablet, Rfl: 0    gabapentin (Neurontin) 300 mg capsule, 1 capsule each evening for two days, then 1 capsule twice daily for two days, then one capsule three times daily (Patient not taking: Reported on  5/27/2025), Disp: 90 capsule, Rfl: 3  [5] No Known Allergies

## 2025-05-28 DIAGNOSIS — G96.191 PERINEURAL CYST: Primary | ICD-10-CM

## 2025-05-28 RX ORDER — TRANEXAMIC ACID 650 MG/1
1300 TABLET ORAL ONCE
Status: CANCELLED | OUTPATIENT
Start: 2025-05-28 | End: 2025-05-28

## 2025-05-28 RX ORDER — CELECOXIB 400 MG/1
400 CAPSULE ORAL ONCE
OUTPATIENT
Start: 2025-05-28 | End: 2025-05-28

## 2025-05-28 RX ORDER — ACETAMINOPHEN 325 MG/1
975 TABLET ORAL ONCE
OUTPATIENT
Start: 2025-05-28 | End: 2025-05-28

## 2025-06-04 ENCOUNTER — CLINICAL SUPPORT (OUTPATIENT)
Dept: PREADMISSION TESTING | Facility: HOSPITAL | Age: 40
End: 2025-06-04
Payer: COMMERCIAL

## 2025-06-04 NOTE — CPM/PAT H&P
CPM/PAT Evaluation       Name: David Cummins (David Cummins)  /Age: 1985/39 y.o.     {[facility] Visit Type:61944}      Chief Complaint: ***    HPI    Medical History[1]    Surgical History[2]    Patient  has no history on file for sexual activity.    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 Authorizing Provider   cyclobenzaprine (Flexeril) 10 mg tablet Take 1 tablet (10 mg) by mouth 3 times a day as needed for muscle spasms.  Patient not taking: Reported on 2025  Ron Abdul,    gabapentin (Neurontin) 300 mg capsule 1 capsule each evening for two days, then 1 capsule twice daily for two days, then one capsule three times daily  Patient not taking: Reported on 2025 4/3/25   Ron Marte MD        [facility] Physical Exam     Airway    Visit Vitals  Smoking Status Never       DASI Risk Score    No data to display       Caprini DVT Assessment    No data to display       Modified Frailty Index    No data to display       [id number] Stroke Risk Points  Current as of just now        N/A 0 to 9 Points:      Last Change: N/A          The [id number] risk score (Lip LAKESHA, et al. 2009. © 2010 American [employer]) quantifies the risk of stroke for a patient with atrial fibrillation. For patients without atrial fibrillation or under the age of 18 this score appears as N/A. Higher score values generally indicate higher risk of stroke.        This score is not applicable to this patient. Components are not calculated.          Revised Cardiac Risk Index    No data to display       Apfel Simplified Score    No data to display       Risk Analysis Index Results This Encounter    No data found in the last 10 encounters.       Prodigy:  High Risk  Total Score: 8              Prodigy Gender Score          ARISCAT Score for Postoperative Pulmonary Complications    No data to display       Anibal Perioperative Risk for Myocardial Infarction or Cardiac Arrest (SAIDA)    No data to display     --Testing/Diagnostic:        - EK25  Sinus bradycardia rate 51 ST-T wave elevation.           - CT Cardiac Scorin22  IMPRESSION:  1. Coronary artery calcium score of 0 *.  2. Partially imaged approximate 1.3 cm nodularity at the distal  stomach could be partially imaged ingested contents although polyp or other lesion cannot be excluded. This is otherwise suboptimally evaluated on this examination. Clinical correlation and follow-up advised.      - MR Knee: 25  IMPRESSION:  Fusiform long segment edema and thickening of the common peroneal nerve at the level of the knee. Some cystic changes are suggested with the near the level of the fibular head. Findings could suggest cystic degeneration. Some fatty atrophy and edema of the anterior compartment musculature suggests component of peroneal denervation.      Small tear of the medial meniscus.      Mild medial and patellofemoral arthrosis.      - H&H: 13.2--38.5 on 25      Patient Specialist/PCP:                                                                                                         Cardiology: Buddy Barrera 22 consult for familial heart disease, with no chronic medical problems is seen for cardiac eval   Father had CABG times 4 at 38(was heavy smoker)-doing OK now       PCP: Lola Wilson CNP 27 presents for Annual Exam (Patient is fasting. Growth on right ear.).       Gastro: Kaushik Hanley 22 evaluation of abnormal CT.  CT scan of the chest done to evaluate calcium score showed a small polyp or polypoid lesion in the stomach.  Could be ingested material however if he ate 4 hours prior to the CT scan then it probably was not any kind of food  particles      Physical Med: Ron Marte 05/01/25 presents for EMG results follow-up for right foot drop        Ortho: Raúl Chow 03/27/25 seen for Right dropfoot, probable neurogenic origin.       Neuro Sx: Hernesto Vincentjonathan 05/27/25 presents with acute RIGHT foot drop with imaging findings consistent with a intraneural ganglion cyst causing peroneal neuropathy.      Plan for resection of the articular branch of the CPN and fenestration of the cyst for peroneal nerve decompression, possible tibiofibular joint resection.         ________________________________________________________________  **Data input only. No medications, history or providers verified   Attempted to reach patient to assess needs. No return call as of this note.         Hannah Bull LPN  Preadmission Testing          Assessment and Plan:     {[facility] ASSESSMENT AND PLAN:18109}               [1]  No past medical history on file.  [2]  Past Surgical History:  Procedure Laterality Date   • OTHER SURGICAL HISTORY  04/14/2016    History Of Prior Surgery   [3]  No family history on file.  [4]  No Known Allergies

## 2025-06-06 ENCOUNTER — TELEPHONE (OUTPATIENT)
Dept: ORTHOPEDIC SURGERY | Facility: CLINIC | Age: 40
End: 2025-06-06

## 2025-06-06 ENCOUNTER — PRE-ADMISSION TESTING (OUTPATIENT)
Dept: PREADMISSION TESTING | Facility: HOSPITAL | Age: 40
End: 2025-06-06
Payer: COMMERCIAL

## 2025-06-06 VITALS
BODY MASS INDEX: 27.64 KG/M2 | HEIGHT: 76 IN | TEMPERATURE: 98.4 F | WEIGHT: 227 LBS | DIASTOLIC BLOOD PRESSURE: 70 MMHG | OXYGEN SATURATION: 97 % | SYSTOLIC BLOOD PRESSURE: 152 MMHG | HEART RATE: 82 BPM

## 2025-06-06 DIAGNOSIS — G96.191 PERINEURAL CYST: Primary | ICD-10-CM

## 2025-06-06 LAB
ABO GROUP (TYPE) IN BLOOD: NORMAL
ANION GAP SERPL CALC-SCNC: 12 MMOL/L (ref 10–20)
ANTIBODY SCREEN: NORMAL
BUN SERPL-MCNC: 16 MG/DL (ref 6–23)
CALCIUM SERPL-MCNC: 9.6 MG/DL (ref 8.6–10.6)
CHLORIDE SERPL-SCNC: 104 MMOL/L (ref 98–107)
CO2 SERPL-SCNC: 27 MMOL/L (ref 21–32)
CREAT SERPL-MCNC: 1.02 MG/DL (ref 0.5–1.3)
EGFRCR SERPLBLD CKD-EPI 2021: >90 ML/MIN/1.73M*2
ERYTHROCYTE [DISTWIDTH] IN BLOOD BY AUTOMATED COUNT: 12.2 % (ref 11.5–14.5)
GLUCOSE SERPL-MCNC: 87 MG/DL (ref 74–99)
HCT VFR BLD AUTO: 41.1 % (ref 41–52)
HGB BLD-MCNC: 14.2 G/DL (ref 13.5–17.5)
MCH RBC QN AUTO: 31.3 PG (ref 26–34)
MCHC RBC AUTO-ENTMCNC: 34.5 G/DL (ref 32–36)
MCV RBC AUTO: 91 FL (ref 80–100)
NRBC BLD-RTO: 0 /100 WBCS (ref 0–0)
PLATELET # BLD AUTO: 234 X10*3/UL (ref 150–450)
POTASSIUM SERPL-SCNC: 4.4 MMOL/L (ref 3.5–5.3)
PREALB SERPL-MCNC: 34.3 MG/DL (ref 18–40)
RBC # BLD AUTO: 4.54 X10*6/UL (ref 4.5–5.9)
RH FACTOR (ANTIGEN D): NORMAL
SODIUM SERPL-SCNC: 139 MMOL/L (ref 136–145)
WBC # BLD AUTO: 5.3 X10*3/UL (ref 4.4–11.3)

## 2025-06-06 PROCEDURE — 87081 CULTURE SCREEN ONLY: CPT

## 2025-06-06 PROCEDURE — 99204 OFFICE O/P NEW MOD 45 MIN: CPT | Performed by: NURSE PRACTITIONER

## 2025-06-06 PROCEDURE — 86901 BLOOD TYPING SEROLOGIC RH(D): CPT

## 2025-06-06 PROCEDURE — 36415 COLL VENOUS BLD VENIPUNCTURE: CPT

## 2025-06-06 PROCEDURE — 80048 BASIC METABOLIC PNL TOTAL CA: CPT | Performed by: STUDENT IN AN ORGANIZED HEALTH CARE EDUCATION/TRAINING PROGRAM

## 2025-06-06 PROCEDURE — 85027 COMPLETE CBC AUTOMATED: CPT | Performed by: STUDENT IN AN ORGANIZED HEALTH CARE EDUCATION/TRAINING PROGRAM

## 2025-06-06 PROCEDURE — 84134 ASSAY OF PREALBUMIN: CPT | Performed by: STUDENT IN AN ORGANIZED HEALTH CARE EDUCATION/TRAINING PROGRAM

## 2025-06-06 RX ORDER — CHLORHEXIDINE GLUCONATE 40 MG/ML
SOLUTION TOPICAL
Qty: 473 ML | Refills: 0 | Status: SHIPPED | OUTPATIENT
Start: 2025-06-06

## 2025-06-06 RX ORDER — CHLORHEXIDINE GLUCONATE ORAL RINSE 1.2 MG/ML
SOLUTION DENTAL
Qty: 15 ML | Refills: 0 | Status: SHIPPED | OUTPATIENT
Start: 2025-06-06

## 2025-06-06 ASSESSMENT — ENCOUNTER SYMPTOMS
GASTROINTESTINAL NEGATIVE: 1
NEUROLOGICAL NEGATIVE: 1
EYES NEGATIVE: 1
NECK NEGATIVE: 1
ENDOCRINE NEGATIVE: 1
CARDIOVASCULAR NEGATIVE: 1
RESPIRATORY NEGATIVE: 1
MUSCULOSKELETAL NEGATIVE: 1
CONSTITUTIONAL NEGATIVE: 1

## 2025-06-06 ASSESSMENT — DUKE ACTIVITY SCORE INDEX (DASI)
CAN YOU DO YARD WORK LIKE RAKING LEAVES, WEEDING OR PUSHING A MOWER: YES
CAN YOU DO MODERATE WORK AROUND THE HOUSE LIKE VACUUMING, SWEEPING FLOORS OR CARRYING GROCERIES: YES
CAN YOU CLIMB A FLIGHT OF STAIRS OR WALK UP A HILL: YES
CAN YOU DO LIGHT WORK AROUND THE HOUSE LIKE DUSTING OR WASHING DISHES: YES
CAN YOU WALK A BLOCK OR TWO ON LEVEL GROUND: YES
DASI METS SCORE: 9
CAN YOU RUN A SHORT DISTANCE: YES
CAN YOU WALK INDOORS, SUCH AS AROUND YOUR HOUSE: YES
CAN YOU PARTICIPATE IN MODERATE RECREATIONAL ACTIVITIES LIKE GOLF, BOWLING, DANCING, DOUBLES TENNIS OR THROWING A BASEBALL OR FOOTBALL: YES
CAN YOU DO HEAVY WORK AROUND THE HOUSE LIKE SCRUBBING FLOORS OR LIFTING AND MOVING HEAVY FURNITURE: YES
TOTAL_SCORE: 50.7
CAN YOU PARTICIPATE IN STRENOUS SPORTS LIKE SWIMMING, SINGLES TENNIS, FOOTBALL, BASKETBALL, OR SKIING: NO
CAN YOU HAVE SEXUAL RELATIONS: YES
CAN YOU TAKE CARE OF YOURSELF (EAT, DRESS, BATHE, OR USE TOILET): YES

## 2025-06-06 ASSESSMENT — LIFESTYLE VARIABLES: SMOKING_STATUS: NONSMOKER

## 2025-06-06 NOTE — PREPROCEDURE INSTRUCTIONS
Fasting Guidelines    NPO Instructions:    Do not eat any food after midnight the night before your surgery/procedure.  You may have up to 13.5 ounces of clear liquids until TWO hours before your instructed arrival time to the hospital. This includes water, black tea/coffee, (no milk or cream), apple juice, and/or electrolyte drinks (Gatorade).  You may chew gum up to TWO hours before your surgery/procedure.    Additional Instructions:     We have sent a prescription for Hibiclens soap and Peridex mouth wash to your preferred pharmacy.  If you have not already, Please  your prescription and start using as directed before surgery.  Follow the instruction sheet provided to you at your CPM/PAT appointment.    Avoid herbal supplements, multivitamins and NSAIDS (non-steroidal anti-inflammatory drugs) such as Advil, Aleve, Ibuprofen, Naproxen, Excedrin, Meloxicam or Celebrex for at least 7 days prior to surgery. May take Tylenol as needed.    Avoid tobacco and alcohol products for 24 hours prior to surgery.    CONTACT SURGEON'S OFFICE IF YOU DEVELOP:  * Fever = 100.4 F   * New respiratory symptoms (e.g. cough, shortness of breath, respiratory distress, sore throat)  * Recent loss of taste or smell  *Flu like symptoms such as headache, fatigue or gastrointestinal symptoms  * You develop any open sores, shingles, burning or painful urination   AND/OR:  * You no longer wish to have the surgery.  * Any other personal circumstances change that may lead to the need to cancel or defer this surgery.  *You were admitted to any hospital within one week of your planned procedure.    Seven/Six Days before Surgery:  Review your medication instructions, stop indicated medications    Day of Surgery:  Review your medication instructions, take indicated medications  Wear comfortable loose fitting clothing  Do not use moisturizers, creams, lotions or perfume  All jewelry and valuables should be left at home    Kirill Asencio  [facility]  Usqvr-[phone]  Doe-[phone]  Email-[email or web address]      Patient Information: Pre-Operative Infection Prevention Measures     Why did I have my nose, under my arms, and groin swabbed?  The purpose of the swab is to identify Staphylococcus aureus inside your nose or on your skin.  The swab was sent to the laboratory for culture.  A positive swab/culture for Staphylococcus aureus is called colonization or carriage.      What is Staphylococcus aureus?  Staphylococcus aureus, also known as “staph”, is a germ found on the skin or in the nose of healthy people.  Sometimes Staphylococcus aureus can get into the body and cause an infection.  This can be minor (such as pimples, boils, or other skin problems).  It might also be serious (such as a blood infection, pneumonia, or a surgical site infection).    What is Staphylococcus aureus colonization or carriage?  Colonization or carriage means that a person has the germ but is not sick from it.  These bacteria can be spread on the hands or when breathing or sneezing.    How is Staphylococcus aureus spread?  It is most often spread by close contact with a person or item that carries it.    What happens if my culture is positive for Staphylococcus aureus?  Your doctor/medical team will use this information to guide any antibiotic treatment which may be necessary.  Regardless of the culture results, we will clean the inside of your nose with a betadine swab just before you have your surgery.      Will I get an infection if I have Staphylococcus aureus in my nose or on my skin?  Anyone can get an infection with Staphylococcus aureus.  However, the best way to reduce your risk of infection is to follow the instructions provided to you for the use of your CHG soap and dental rinse.        Patient Information: Oral/Dental Rinse    What is oral/dental rinse?   It is a mouthwash. It is a way of cleaning the mouth with a  germ-killing solution before your surgery.  The solution contains chlorhexidine, commonly known as CHG.   It is used inside the mouth to kill a bacteria known as Staphylococcus aureus.  Let your doctor know if you are allergic to Chlorhexidine.    Why do I need to use CHG oral/dental rinse?  The CHG oral/dental rinse helps to kill a bacteria in your mouth known as Staphylococcus aureus.     This reduces the risk of infection at the surgical site.      Using your CHG oral/dental rinse  STEPS:  Use your CHG oral/dental rinse after you brush your teeth the night before (at bedtime) and the morning of your surgery.  Follow all directions on your prescription label.    Use the cap on the container to measure 15ml   Swish (gargle if you can) the mouthwash in your mouth for at least 30 seconds, (do not swallow) and spit out  After you use your CHG rinse, do not rinse your mouth with water, drink or eat.  Please refer to the prescription label for the appropriate time to resume oral intake      What side effects might I have using the CHG oral/dental rinse?  CHG rinse will stick to plaque on the teeth.  Brush and floss just before use.  Teeth brushing will help avoid staining of plaque during use.      Patient Information: Home Preoperative Antibacterial Shower      What is a home preoperative antibacterial shower?  This shower is a way of cleaning the skin with a germ-killing solution before surgery.  The solution contains chlorhexidine, commonly known as CHG.  CHG is a skin cleanser with germ-killing ability.  Let your doctor know if you are allergic to chlorhexidine.    Why do I need to take a preoperative antibacterial shower?  Skin is not sterile.  It is best to try to make your skin as free of germs as possible before surgery.  Proper cleansing with a germ-killing soap before surgery can lower the number of germs on your skin.  This helps to reduce the risk of infection at the surgical site.  Following the  instructions listed below will help you prepare your skin for surgery.      How do I use the solution?  Steps:  Begin using your CHG soap 5 days before your scheduled surgery on ________________________.    First, wash and rinse your hair using the CHG soap. Keep CHG soap away from ear canals and eyes.  Rinse completely, do not condition.  Hair extensions should be removed.  Wash your face with your normal soap and rinse.    Apply the CHG solution to a clean wet washcloth.  Turn the water off or move away from the water spray to avoid premature rinsing of the CHG soap as you are applying.   Firmly lather your entire body from the neck down.  Do not use on your face.  Pay special attention to the area(s) where your incision(s) will be located unless they are on your face.  Avoid scrubbing your skin too hard.  The important point is to have the CHG soap sit on your skin for 3 minutes.    When the 3 minutes are up, turn on the water and rinse the CHG solution off your body completely.   DO NOT wash with regular soap after you have used the CHG soap solution  Pat yourself dry with a clean, freshly-laundered towel.  DO NOT apply powders, deodorants, or lotions.  Dress in clean, freshly laundered nightclothes.    Be sure to sleep with clean, freshly laundered sheets.  Be aware that CHG will cause stains on fabrics; if you wash them with bleach after use.  Rinse your washcloth and other linens that have contact with CHG completely.  Use only non-chlorine detergents to launder the items used.   The morning of surgery is the fifth day.  Repeat the above steps and dress in clean comfortable clothing     Whom should I contact if I have any questions regarding the use of CHG soap?  Call the [facility] at [phone] if you have any questions.               Preoperative Brain Exercises    What are brain exercises?  A brain exercise is any activity that  engages your thinking (cognitive) skills.    What types of activities are considered brain exercises?  Jigsaw puzzles, crossword puzzles, word jumble, memory games, word search, and many more.  Many can be found free online or on your phone via a mobile wm.    Why should I do brain exercises before my surgery?  More recent research has shown brain exercise before surgery can lower the risk of postoperative delirium (confusion) which can be especially important for older adults.  Patients who did brain exercises for 5 to 10 hours the days before surgery, cut their risk of postoperative delirium in half up to 1 week after surgery.         [facility]    Preoperative Deep Breathing Exercises    Why it is important to do deep breathing exercises before my surgery?  Deep breathing exercises strengthen your breathing muscles.  This helps you to recover after your surgery and decreases the chance of breathing complications.      How are the deep breathing exercises done?  Sit straight with your back supported.  Breathe in deeply and slowly through your nose. Your lower rib cage should expand and your abdomen may move forward.  Hold that breath for 3 to 5 seconds.  Breathe out through pursed lips, slowly and completely.  Rest and repeat 10 times every hour while awake.  Rest longer if you become dizzy or lightheaded.         Patient and Family Education             Ways You Can Help Prevent Blood Clots             This handout explains some simple things you can do to help prevent blood clots.      Blood clots are blockages that can form in the body's veins. When a blood clot forms in your deep veins, it may be called a deep vein thrombosis, or DVT for short. Blood clots can happen in any part of the body where blood flows, but they are most common in the arms and legs. If a piece of a blood clot breaks free and travels to the lungs, it is called a pulmonary embolus (PE). A PE can be a very  serious problem.         Being in the hospital or having surgery can raise your chances of getting a blood clot because you may not be well enough to move around as much as you normally do.         Ways you can help prevent blood clots in the hospital         Wearing SCDs. SCDs stands for Sequential Compression Devices.   SCDs are special sleeves that wrap around your legs  They attach to a pump that fills them with air to gently squeeze your legs every few minutes.   This helps return the blood in your legs to your heart.   SCDs should only be taken off when walking or bathing.   SCDs may not be comfortable, but they can help save your life.               Wearing compression stockings - if your doctor orders them. These special snug fitting stockings gently squeeze your legs to help blood flow.       Walking. Walking helps move the blood in your legs.   If your doctor says it is ok, try walking the halls at least   5 times a day. Ask us to help you get up, so you don't fall.      Taking any blood thinning medicines your doctor orders.        Page 1 of 2     [facility]; 3/23   Ways you can help prevent blood clots at home       Wearing compression stockings - if your doctor orders them. ? Walking - to help move the blood in your legs.       Taking any blood thinning medicines your doctor orders.      Signs of a blood clot or PE      Tell your doctor or nurse know right away if you have of the problems listed below.    If you are at home, seek medical care right away. Call 911 for chest pain or problems breathing.               Signs of a blood clot (DVT) - such as pain,  swelling, redness or warmth in your arm or leg      Signs of a pulmonary embolism (PE) - such as chest     pain or feeling short of breath

## 2025-06-06 NOTE — H&P (VIEW-ONLY)
CPM/PAT Evaluation       Name: David Cummins (David Cummins)  /Age: 1985/39 y.o.     Visit Type:   In-Person       Chief Complaint: perioperative evaluation      HPI  The patient is a 39 year old male with acute RIGHT foot drop. 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 He is referred today by Dr. Hernesto Kelsey for perioperative evaluation in anticipation of Peroneal Nerve Decompression & Articular Branch Neurectomy for Intraneural Ganglion Cyst - Right on 25.    Medical History[1]    Surgical History[2]    Patient  has no history on file for sexual activity.    Family History[3]    Allergies[4]      Prior to Admission medications    Medication Sig Start Date End Date Taking? Authorizing Provider   cyclobenzaprine (Flexeril) 10 mg tablet Take 1 tablet (10 mg) by mouth 3 times a day as needed for muscle spasms.  Patient not taking: Reported on 2025  Ron Abdul DO   gabapentin (Neurontin) 300 mg capsule 1 capsule each evening for two days, then 1 capsule twice daily for two days, then one capsule three times daily  Patient not taking: Reported on 2025 4/3/25   Ron Marte MD        PAT ROS:   Constitutional:   neg    Neuro/Psych:    Right foot paresthesias  neg    Eyes:   neg    Ears:   neg    Nose:   neg    Mouth:   neg    Throat:   neg    Neck:   neg    Cardio:   neg    Respiratory:   neg    Endocrine:   neg    GI:   neg    :   neg    Musculoskeletal:   neg    Hematologic:   neg    Skin:  neg        Physical Exam  Vitals reviewed.   Constitutional:       Appearance: Normal appearance.   HENT:      Head: Normocephalic and atraumatic.      Nose: Nose normal.      Mouth/Throat:      Mouth: Mucous membranes are moist.      Pharynx: Oropharynx is clear.   Eyes:      Extraocular Movements: Extraocular movements intact.      Pupils: Pupils are equal, round, and reactive to  "light.   Cardiovascular:      Rate and Rhythm: Normal rate and regular rhythm.      Pulses: Normal pulses.      Heart sounds: Normal heart sounds.   Pulmonary:      Effort: Pulmonary effort is normal.      Breath sounds: Normal breath sounds.   Musculoskeletal:         General: Normal range of motion.      Cervical back: Normal range of motion.   Skin:     General: Skin is warm and dry.   Neurological:      General: No focal deficit present.      Mental Status: He is alert and oriented to person, place, and time.   Psychiatric:         Mood and Affect: Mood normal.         Behavior: Behavior normal.          PAT AIRWAY:   Airway:     Mallampati::  III    TM distance::  >3 FB    Neck ROM::  Full  normal        Visit Vitals  /70   Pulse 82   Temp 36.9 °C (98.4 °F)   Ht 1.93 m (6' 4\")   Wt 103 kg (227 lb)   SpO2 97%   BMI 27.63 kg/m²   Smoking Status Never   BSA 2.35 m²       DASI Risk Score      Flowsheet Row Pre-Admission Testing from 6/6/2025 in [facility]   Can you take care of yourself (eat, dress, bathe, or use toilet)?  2.75 filed at 06/06/2025 0838   Can you walk indoors, such as around your house? 1.75 filed at 06/06/2025 0838   Can you walk a block or two on level ground?  2.75 filed at 06/06/2025 0838   Can you climb a flight of stairs or walk up a hill? 5.5 filed at 06/06/2025 0838   Can you run a short distance? 8 filed at 06/06/2025 0838   Can you do light work around the house like dusting or washing dishes? 2.7 filed at 06/06/2025 0838   Can you do moderate work around the house like vacuuming, sweeping floors or carrying groceries? 3.5 filed at 06/06/2025 0838   Can you do heavy work around the house like scrubbing floors or lifting and moving heavy furniture?  8 filed at 06/06/2025 0838   Can you do yard work like raking leaves, weeding or pushing a mower? 4.5 filed at 06/06/2025 0838   Can you have sexual relations? 5.25 filed at 06/06/2025 0838   Can you participate in " moderate recreational activities like golf, bowling, dancing, doubles tennis or throwing a baseball or football? 6 filed at 06/06/2025 0838   Can you participate in strenous sports like swimming, singles tennis, football, basketball, or skiing? 0 filed at 06/06/2025 0838   DASI SCORE 50.7 filed at 06/06/2025 0838   METS Score (Will be calculated only when all the questions are answered) 9 filed at 06/06/2025 0838          Caprini DVT Assessment      Flowsheet Row Pre-Admission Testing from 6/6/2025 in [facility]   DVT Score (IF A SCORE IS NOT CALCULATING, MUST SELECT A BMI TO COMPLETE) 4 filed at 06/06/2025 0844   Surgical Factors Major surgery planned, lasting 2-3 hours filed at 06/06/2025 0844   BMI (BMI MUST BE CHOSEN) 30 or less filed at 06/06/2025 0844          Modified Frailty Index    No data to display       [id number] Stroke Risk Points  Current as of just now        N/A 0 to 9 Points:      Last Change: N/A          The [id number] risk score (Lip LAKESHA, et al. 2009. © 2010 [employer]) quantifies the risk of stroke for a patient with atrial fibrillation. For patients without atrial fibrillation or under the age of 18 this score appears as N/A. Higher score values generally indicate higher risk of stroke.        This score is not applicable to this patient. Components are not calculated.          Revised Cardiac Risk Index      Flowsheet Row Pre-Admission Testing from 6/6/2025 in [facility]   High-Risk Surgery (Intraperitoneal, Intrathoracic,Suprainguinal vascular) 0 filed at 06/06/2025 0844   History of ischemic heart disease (History of MI, History of positive exercuse test, Current chest paint considered due to myocardial ischemia, Use of nitrate therapy, ECG with pathological Q Waves) 0 filed at 06/06/2025 0844   History of congestive heart failure (pulmonary edemia, bilateral rales or S3 gallop, Paroxysmal nocturnal dyspnea, CXR showing  pulmonary vascular redistribution) 0 filed at 06/06/2025 0844   History of cerebrovascular disease (Prior TIA or stroke) 0 filed at 06/06/2025 0844   Pre-operative insulin treatment 0 filed at 06/06/2025 0844   Pre-operative creatinine>2 mg/dl 0 filed at 06/06/2025 0844   Revised Cardiac Risk Calculator 0 filed at 06/06/2025 0844          Apfel Simplified Score      Flowsheet Row Pre-Admission Testing from 6/6/2025 in [facility]   Smoking status 1 filed at 06/06/2025 0844   History of motion sickness or PONV  0 filed at 06/06/2025 0844   Use of postoperative opioids 1 filed at 06/06/2025 0844   Gender - Female 0=No filed at 06/06/2025 0844   Apfel Simplified Score Calculator 2 filed at 06/06/2025 0844          Risk Analysis Index Results This Encounter    No data found in the last 10 encounters.       Stop Bang Score      Flowsheet Row Pre-Admission Testing from 6/6/2025 in [facility]   Do you snore loudly? 1 filed at 06/06/2025 0838   Do you often feel tired or fatigued after your sleep? 1 filed at 06/06/2025 0838   Has anyone ever observed you stop breathing in your sleep? 0 filed at 06/06/2025 0838   Do you have or are you being treated for high blood pressure? 0 filed at 06/06/2025 0838   Recent BMI (Calculated) 27.6 filed at 06/06/2025 0838   Is BMI greater than 35 kg/m2? 0=No filed at 06/06/2025 0838   Age older than 50 years old? 0=No filed at 06/06/2025 0838   Is your neck circumference greater than 17 inches (Male) or 16 inches (Female)? 0 filed at 06/06/2025 0838   Gender - Male 1=Yes filed at 06/06/2025 0838   STOP-BANG Total Score 3 filed at 06/06/2025 0838          Prodigy: High Risk  Total Score: 8              Prodigy Gender Score          ARISCAT Score for Postoperative Pulmonary Complications      Flowsheet Row Pre-Admission Testing from 6/6/2025 in [facility]   Age Calculated Score 0 filed at 06/06/2025 0900   Preoperative SpO2 0 filed at  06/06/2025 0900   Respiratory infection in the last month Either upper or lower (i.e., URI, bronchitis, pneumonia), with fever and antibiotic treatment 0 filed at 06/06/2025 0900   Preoperative anemia (Hgb less than 10 g/dl) 0 filed at 06/06/2025 0900   Surgical incision  0 filed at 06/06/2025 0900   Duration of surgery  16 filed at 06/06/2025 0900   Emergency Procedure  0 filed at 06/06/2025 0900   ARISCAT Total Score  16 filed at 06/06/2025 0900          Anibal Perioperative Risk for Myocardial Infarction or Cardiac Arrest (SAIDA)    No data to display       Recent Results (from the past 2 weeks)   Basic Metabolic Panel    Collection Time: 06/06/25  8:54 AM   Result Value Ref Range    Glucose 87 74 - 99 mg/dL    Sodium 139 136 - 145 mmol/L    Potassium 4.4 3.5 - 5.3 mmol/L    Chloride 104 98 - 107 mmol/L    Bicarbonate 27 21 - 32 mmol/L    Anion Gap 12 10 - 20 mmol/L    Urea Nitrogen 16 6 - 23 mg/dL    Creatinine 1.02 0.50 - 1.30 mg/dL    eGFR >90 >60 mL/min/1.73m*2    Calcium 9.6 8.6 - 10.6 mg/dL   Prealbumin    Collection Time: 06/06/25  8:54 AM   Result Value Ref Range    Prealbumin 34.3 18.0 - 40.0 mg/dL   CBC    Collection Time: 06/06/25  8:54 AM   Result Value Ref Range    WBC 5.3 4.4 - 11.3 x10*3/uL    nRBC 0.0 0.0 - 0.0 /100 WBCs    RBC 4.54 4.50 - 5.90 x10*6/uL    Hemoglobin 14.2 13.5 - 17.5 g/dL    Hematocrit 41.1 41.0 - 52.0 %    MCV 91 80 - 100 fL    MCH 31.3 26.0 - 34.0 pg    MCHC 34.5 32.0 - 36.0 g/dL    RDW 12.2 11.5 - 14.5 %    Platelets 234 150 - 450 x10*3/uL   Staphylococcus aureus/MRSA colonization, Culture    Collection Time: 06/06/25  8:54 AM    Specimen: Anterior Nares; Swab   Result Value Ref Range    Staph/MRSA Screen Culture No Staphylococcus aureus isolated    Type And Screen Is this order related to pregnancy or an upcoming surgery? Yes; Where will this surgery/delivery [facility]; What is the date of the surgery? 6/13/2025; Has this patient ever had a  transfusion? Unknown; ...    Collection Time: 25  8:54 AM   Result Value Ref Range    ABO TYPE B     Rh TYPE POS     ANTIBODY SCREEN NEG         --Testing/Diagnostic:      - EK25  Sinus bradycardia rate 51 ST-T wave elevation.           - CT Cardiac Scorin22  IMPRESSION:  1. Coronary artery calcium score of 0 *.  2. Partially imaged approximate 1.3 cm nodularity at the distal  stomach could be partially imaged ingested contents although polyp or other lesion cannot be excluded. This is otherwise suboptimally evaluated on this examination. Clinical correlation and follow-up advised.    - MR Knee: 25  IMPRESSION:  Fusiform long segment edema and thickening of the common peroneal nerve at the level of the knee. Some cystic changes are suggested with the near the level of the fibular head. Findings could suggest cystic degeneration. Some fatty atrophy and edema of the anterior compartment musculature suggests component of peroneal denervation.      Small tear of the medial meniscus.      Mild medial and patellofemoral arthrosis.      Patient Specialist/PCP:                                                                                                       Cardiology: Buddy Barrera 22 consult for familial heart disease, with no chronic medical problems is seen for cardiac eval   Father had CABG times 4 at 38(was heavy smoker)-doing OK now       PCP: Lola Wilson CNP 25 presents for Annual Exam (Patient is fasting. Growth on right ear.).     Physical Med: Ron Marte 25 presents for EMG results follow-up for right foot drop     Ortho: Raúl Chow 25 seen for Right dropfoot, probable neurogenic origin.     Assessment and Plan:     Anesthesia  The patient denies problems with anesthesia in the past such as PONV, prolonged sedation, awareness, dental damage, aspiration, cardiac arrest, difficult intubation, or unexpected hospital admissions.      Airway  No documented or reported history of airway difficulty.     Neurology  The patient has no neurological diagnoses or significant findings on chart review, clinical presentation, and evaluation. No grossly apparent neurological perioperative risk.     The patient is at increased risk for perioperative stroke secondary to general anesthesia, operative time >2.5 hours. Handouts for preoperative brain exercises given to patient.    HEENT  No diagnoses or significant findings on chart review or clinical presentation and evaluation.    Cardiovascular  No diagnoses or significant findings on chart review or clinical presentation and evaluation.    METS  The patient's functional capacity is greater than 4 METS.  RCRI  The patient meets 0 RCRI criteria and therefor has a 3.9% risk of major adverse cardiac complications.  SAIDA score which indicates a 0% risk of intraoperative or 30-day postoperative MACE (major adverse cardiac event).    He is scheduled for non-cardiac surgery associated with elevated risk. The patient has no major cardiac contraindications to non- cardiac surgery.    Pulmonary  No significant findings on chart review or clinical presentation and evaluation.    STOP BANG 3, which places patient at intermediate risk for having MANDY.  ARISCAT 16, low, 1.6% risk of in-hospital postoperative pulmonary complications  PRODIGY 8, intermediate risk of respiratory depression episode.     Patient given PI sheet for preoperative deep breathing exercises.  Encourage  incentive spirometry in the postoperative period as deemed necessary.    Endocrine  No diagnoses or significant findings on chart review or clinical presentation and evaluation.    Gastrointestinal  No diagnoses or significant findings on chart review or clinical presentation and evaluation.    Eat 10- 0,  self-perceived oropharyngeal dysphagia scale (0-40)     Genitourinary  No diagnoses or significant findings on chart review or clinical  presentation and evaluation.    Renal  No renal diagnoses or significant findings on chart review or clinical presentation and evaluation. The patient has specific risk factors associated with increased risk of perioperative renal complications related to male gender. Preventative measures include preoperative hydration.    Hematology  No diagnoses or significant findings on chart review or clinical presentation and evaluation.    Caprini score 4, intermediate risk of perioperative VTE.     Patient instructed to ambulate as soon as possible postoperatively to decrease thromboembolic risk. Initiate mechanical DVT prophylaxis as soon as possible and initiate chemical prophylaxis when deemed safe from a bleeding standpoint post surgery.     Transfusion Evaluation  A type and screen was obtained given the likelihood for perioperative transfusion of blood or blood products.    Musculoskeletal  The patient has right foot drop, intraneural ganglion cyst causing peroneal neuropathy. Scheduled for surgery with Dr. Kelsey on 6/13/25.    ID  No diagnoses or significant findings on chart review or clinical presentation and evaluation. MRSA screening obtained. Prescriptions and instructions given for Hibiclens and Peridex.    -Preoperative medication instructions were provided and reviewed with the patient.  Any additional testing or evaluation was explained to the patient.  NPO Instructions were discussed, and the patient's questions were answered prior to conclusion of this encounter. Patient verbalized understanding of preoperative instructions. After Visit Summary given.      Labs ordered and reviewed  CBC, BMP, Type and screen, and MRSA    No further work up indicated.              [1]   Past Medical History:  Diagnosis Date    Foot drop, right     Peroneal neuropathy, right    [2]   Past Surgical History:  Procedure Laterality Date    NO PAST SURGERIES     [3]   Family History  Problem Relation Name Age of Onset     Hypertension Father      Heart disease Father     [4] No Known Allergies

## 2025-06-06 NOTE — TELEPHONE ENCOUNTER
Fernanda from [employer] called in and stated that the patient's case will be closed because the office from [employer] cannot get in touch with the patient. Several messages were left. Fernanda just wanted to make the office aware.  If the office should have any questions Fernanda can be reached at [phone] ext [phone]

## 2025-06-06 NOTE — CPM/PAT H&P
CPM/PAT Evaluation       Name: David Cummins (David Cummins)  /Age: 1985/39 y.o.     Visit Type:   In-Person       Chief Complaint: perioperative evaluation      HPI  The patient is a 39 year old male with acute RIGHT foot drop. 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 He is referred today by Dr. Hernesto Kelsey for perioperative evaluation in anticipation of Peroneal Nerve Decompression & Articular Branch Neurectomy for Intraneural Ganglion Cyst - Right on 25    Medical History[1]    Surgical History[2]    Patient  has no history on file for sexual activity.    Family History[3]    Allergies[4]      Prior to Admission medications    Medication Sig Start Date End Date Taking? Authorizing Provider   cyclobenzaprine (Flexeril) 10 mg tablet Take 1 tablet (10 mg) by mouth 3 times a day as needed for muscle spasms.  Patient not taking: Reported on 2025  Ron Abdul DO   gabapentin (Neurontin) 300 mg capsule 1 capsule each evening for two days, then 1 capsule twice daily for two days, then one capsule three times daily  Patient not taking: Reported on 2025 4/3/25   Ron Marte MD        PAT ROS:   Constitutional:   neg    Neuro/Psych:    Right foot paresthesias  neg    Eyes:   neg    Ears:   neg    Nose:   neg    Mouth:   neg    Throat:   neg    Neck:   neg    Cardio:   neg    Respiratory:   neg    Endocrine:   neg    GI:   neg    :   neg    Musculoskeletal:   neg    Hematologic:   neg    Skin:  neg        Physical Exam  Vitals reviewed.   Constitutional:       Appearance: Normal appearance.   HENT:      Head: Normocephalic and atraumatic.      Nose: Nose normal.      Mouth/Throat:      Mouth: Mucous membranes are moist.      Pharynx: Oropharynx is clear.   Eyes:      Extraocular Movements: Extraocular movements intact.      Pupils: Pupils are equal, round, and reactive to  "light.   Cardiovascular:      Rate and Rhythm: Normal rate and regular rhythm.      Pulses: Normal pulses.      Heart sounds: Normal heart sounds.   Pulmonary:      Effort: Pulmonary effort is normal.      Breath sounds: Normal breath sounds.   Musculoskeletal:         General: Normal range of motion.      Cervical back: Normal range of motion.   Skin:     General: Skin is warm and dry.   Neurological:      General: No focal deficit present.      Mental Status: He is alert and oriented to person, place, and time.   Psychiatric:         Mood and Affect: Mood normal.         Behavior: Behavior normal.          PAT AIRWAY:   Airway:     Mallampati::  III    TM distance::  >3 FB    Neck ROM::  Full  normal        Visit Vitals  /70   Pulse 82   Temp 36.9 °C (98.4 °F)   Ht 1.93 m (6' 4\")   Wt 103 kg (227 lb)   SpO2 97%   BMI 27.63 kg/m²   Smoking Status Never   BSA 2.35 m²       DASI Risk Score      Flowsheet Row Pre-Admission Testing from 6/6/2025 in [facility]   Can you take care of yourself (eat, dress, bathe, or use toilet)?  2.75 filed at 06/06/2025 0838   Can you walk indoors, such as around your house? 1.75 filed at 06/06/2025 0838   Can you walk a block or two on level ground?  2.75 filed at 06/06/2025 0838   Can you climb a flight of stairs or walk up a hill? 5.5 filed at 06/06/2025 0838   Can you run a short distance? 8 filed at 06/06/2025 0838   Can you do light work around the house like dusting or washing dishes? 2.7 filed at 06/06/2025 0838   Can you do moderate work around the house like vacuuming, sweeping floors or carrying groceries? 3.5 filed at 06/06/2025 0838   Can you do heavy work around the house like scrubbing floors or lifting and moving heavy furniture?  8 filed at 06/06/2025 0838   Can you do yard work like raking leaves, weeding or pushing a mower? 4.5 filed at 06/06/2025 0838   Can you have sexual relations? 5.25 filed at 06/06/2025 0838   Can you participate in " moderate recreational activities like golf, bowling, dancing, doubles tennis or throwing a baseball or football? 6 filed at 06/06/2025 0838   Can you participate in strenous sports like swimming, singles tennis, football, basketball, or skiing? 0 filed at 06/06/2025 0838   DASI SCORE 50.7 filed at 06/06/2025 0838   METS Score (Will be calculated only when all the questions are answered) 9 filed at 06/06/2025 0838          Caprini DVT Assessment      Flowsheet Row Pre-Admission Testing from 6/6/2025 in [facility]   DVT Score (IF A SCORE IS NOT CALCULATING, MUST SELECT A BMI TO COMPLETE) 4 filed at 06/06/2025 0844   Surgical Factors Major surgery planned, lasting 2-3 hours filed at 06/06/2025 0844   BMI (BMI MUST BE CHOSEN) 30 or less filed at 06/06/2025 0844          Modified Frailty Index    No data to display       [id number] Stroke Risk Points  Current as of just now        N/A 0 to 9 Points:      Last Change: N/A          The [id number] risk score (Lip LAKESHA, et al. 2009. © 2010 American College [employer]) quantifies the risk of stroke for a patient with atrial fibrillation. For patients without atrial fibrillation or under the age of 18 this score appears as N/A. Higher score values generally indicate higher risk of stroke.        This score is not applicable to this patient. Components are not calculated.          Revised Cardiac Risk Index      Flowsheet Row Pre-Admission Testing from 6/6/2025 in [facility]   High-Risk Surgery (Intraperitoneal, Intrathoracic,Suprainguinal vascular) 0 filed at 06/06/2025 0844   History of ischemic heart disease (History of MI, History of positive exercuse test, Current chest paint considered due to myocardial ischemia, Use of nitrate therapy, ECG with pathological Q Waves) 0 filed at 06/06/2025 0844   History of congestive heart failure (pulmonary edemia, bilateral rales or S3 gallop, Paroxysmal nocturnal dyspnea, CXR showing  pulmonary vascular redistribution) 0 filed at 06/06/2025 0844   History of cerebrovascular disease (Prior TIA or stroke) 0 filed at 06/06/2025 0844   Pre-operative insulin treatment 0 filed at 06/06/2025 0844   Pre-operative creatinine>2 mg/dl 0 filed at 06/06/2025 0844   Revised Cardiac Risk Calculator 0 filed at 06/06/2025 0844          Apfel Simplified Score      Flowsheet Row Pre-Admission Testing from 6/6/2025 in [facility]   Smoking status 1 filed at 06/06/2025 0844   History of motion sickness or PONV  0 filed at 06/06/2025 0844   Use of postoperative opioids 1 filed at 06/06/2025 0844   Gender - Female 0=No filed at 06/06/2025 0844   Apfel Simplified Score Calculator 2 filed at 06/06/2025 0844          Risk Analysis Index Results This Encounter    No data found in the last 10 encounters.       Stop Bang Score      Flowsheet Row Pre-Admission Testing from 6/6/2025 in [facility]   Do you snore loudly? 1 filed at 06/06/2025 0838   Do you often feel tired or fatigued after your sleep? 1 filed at 06/06/2025 0838   Has anyone ever observed you stop breathing in your sleep? 0 filed at 06/06/2025 0838   Do you have or are you being treated for high blood pressure? 0 filed at 06/06/2025 0838   Recent BMI (Calculated) 27.6 filed at 06/06/2025 0838   Is BMI greater than 35 kg/m2? 0=No filed at 06/06/2025 0838   Age older than 50 years old? 0=No filed at 06/06/2025 0838   Is your neck circumference greater than 17 inches (Male) or 16 inches (Female)? 0 filed at 06/06/2025 0838   Gender - Male 1=Yes filed at 06/06/2025 0838   STOP-BANG Total Score 3 filed at 06/06/2025 0838          Prodigy: High Risk  Total Score: 8              Prodigy Gender Score          ARISCAT Score for Postoperative Pulmonary Complications    No data to display       Barnett Perioperative Risk for Myocardial Infarction or Cardiac Arrest (SAIDA)    No data to display     No results found for this or any previous  visit (from the past 2 weeks).     --Testing/Diagnostic:      - EK25  Sinus bradycardia rate 51 ST-T wave elevation.           - CT Cardiac Scorin22  IMPRESSION:  1. Coronary artery calcium score of 0 *.  2. Partially imaged approximate 1.3 cm nodularity at the distal  stomach could be partially imaged ingested contents although polyp or other lesion cannot be excluded. This is otherwise suboptimally evaluated on this examination. Clinical correlation and follow-up advised.    - MR Knee: 25  IMPRESSION:  Fusiform long segment edema and thickening of the common peroneal nerve at the level of the knee. Some cystic changes are suggested with the near the level of the fibular head. Findings could suggest cystic degeneration. Some fatty atrophy and edema of the anterior compartment musculature suggests component of peroneal denervation.      Small tear of the medial meniscus.      Mild medial and patellofemoral arthrosis.      Patient Specialist/PCP:                                                                                                       Cardiology: Buddy Barrera 22 consult for familial heart disease, with no chronic medical problems is seen for cardiac eval   Father had CABG times 4 at 38(was heavy smoker)-doing OK now       PCP: Lola Wilson CNP 25 presents for Annual Exam (Patient is fasting. Growth on right ear.).     Physical Med: Ron Marte 25 presents for EMG results follow-up for right foot drop     Ortho: Raúl Chow 25 seen for Right dropfoot, probable neurogenic origin.     Assessment and Plan:     Anesthesia  The patient denies problems with anesthesia in the past such as PONV, prolonged sedation, awareness, dental damage, aspiration, cardiac arrest, difficult intubation, or unexpected hospital admissions.     Airway  No documented or reported history of airway difficulty.     Neurology  The patient has no neurological diagnoses  or significant findings on chart review, clinical presentation, and evaluation. No grossly apparent neurological perioperative risk.     The patient is at increased risk for perioperative stroke secondary to general anesthesia, operative time >2.5 hours. Handouts for preoperative brain exercises given to patient.    HEENT  No diagnoses or significant findings on chart review or clinical presentation and evaluation.    Cardiovascular  No diagnoses or significant findings on chart review or clinical presentation and evaluation.    METS  The patient's functional capacity is greater than 4 METS.  RCRI  The patient meets 0 RCRI criteria and therefor has a 3.9% risk of major adverse cardiac complications.  SAIDA score which indicates a 0% risk of intraoperative or 30-day postoperative MACE (major adverse cardiac event).    He is scheduled for non-cardiac surgery associated with elevated risk. The patient has no major cardiac contraindications to non- cardiac surgery.    Pulmonary  No significant findings on chart review or clinical presentation and evaluation.    STOP BANG 3, which places patient at intermediate risk for having MANDY.  ARISCAT 16, low, 1.6% risk of in-hospital postoperative pulmonary complications  PRODIGY 8, intermediate risk of respiratory depression episode.     Patient given PI sheet for preoperative deep breathing exercises.  Encourage  incentive spirometry in the postoperative period as deemed necessary.    Endocrine  No diagnoses or significant findings on chart review or clinical presentation and evaluation.    Gastrointestinal  No diagnoses or significant findings on chart review or clinical presentation and evaluation.    Eat 10- 0,  self-perceived oropharyngeal dysphagia scale (0-40)     Genitourinary  No diagnoses or significant findings on chart review or clinical presentation and evaluation.    Renal  No renal diagnoses or significant findings on chart review or clinical presentation and  evaluation. The patient has specific risk factors associated with increased risk of perioperative renal complications related to male gender. Preventative measures include preoperative hydration.    Hematology  No diagnoses or significant findings on chart review or clinical presentation and evaluation.    Caprini score 4, intermediate risk of perioperative VTE.     Patient instructed to ambulate as soon as possible postoperatively to decrease thromboembolic risk. Initiate mechanical DVT prophylaxis as soon as possible and initiate chemical prophylaxis when deemed safe from a bleeding standpoint post surgery.     Transfusion Evaluation  A type and screen was obtained given the likelihood for perioperative transfusion of blood or blood products.    Musculoskeletal  The patient has right foot drop, intraneural ganglion cyst causing peroneal neuropathy. Scheduled for surgery with Dr. Kelsey on 6/13/25.    ID  No diagnoses or significant findings on chart review or clinical presentation and evaluation. MRSA screening obtained. Prescriptions and instructions given for Hibiclens and Peridex.    -Preoperative medication instructions were provided and reviewed with the patient.  Any additional testing or evaluation was explained to the patient.  NPO Instructions were discussed, and the patient's questions were answered prior to conclusion of this encounter. Patient verbalized understanding of preoperative instructions. After Visit Summary given.      Labs ordered and reviewed  CBC, BMP, Type and screen, and MRSA    No further work up indicated.              [1]   Past Medical History:  Diagnosis Date    Foot drop, right     Peroneal neuropathy, right    [2]   Past Surgical History:  Procedure Laterality Date    NO PAST SURGERIES     [3] No family history on file.  [4] No Known Allergies     presentation and evaluation.    Renal  No renal diagnoses or significant findings on chart review or clinical presentation and evaluation. The patient has specific risk factors associated with increased risk of perioperative renal complications related to male gender. Preventative measures include preoperative hydration.    Hematology  No diagnoses or significant findings on chart review or clinical presentation and evaluation.    Caprini score 4, intermediate risk of perioperative VTE.     Patient instructed to ambulate as soon as possible postoperatively to decrease thromboembolic risk. Initiate mechanical DVT prophylaxis as soon as possible and initiate chemical prophylaxis when deemed safe from a bleeding standpoint post surgery.     Transfusion Evaluation  A type and screen was obtained given the likelihood for perioperative transfusion of blood or blood products.    Musculoskeletal  The patient has right foot drop, intraneural ganglion cyst causing peroneal neuropathy. Scheduled for surgery with Dr. Kelsey on 6/13/25.    ID  No diagnoses or significant findings on chart review or clinical presentation and evaluation. MRSA screening obtained. Prescriptions and instructions given for Hibiclens and Peridex.    -Preoperative medication instructions were provided and reviewed with the patient.  Any additional testing or evaluation was explained to the patient.  NPO Instructions were discussed, and the patient's questions were answered prior to conclusion of this encounter. Patient verbalized understanding of preoperative instructions. After Visit Summary given.      Labs ordered and reviewed  CBC, BMP, Type and screen, and MRSA    No further work up indicated.              [1]   Past Medical History:  Diagnosis Date    Foot drop, right     Peroneal neuropathy, right    [2]   Past Surgical History:  Procedure Laterality Date    NO PAST SURGERIES     [3]   Family History  Problem Relation Name Age of Onset     Hypertension Father      Heart disease Father     [4] No Known Allergies

## 2025-06-08 LAB — STAPHYLOCOCCUS SPEC CULT: NORMAL

## 2025-06-12 ENCOUNTER — ANESTHESIA EVENT (OUTPATIENT)
Dept: OPERATING ROOM | Facility: HOSPITAL | Age: 40
End: 2025-06-12
Payer: COMMERCIAL

## 2025-06-12 ENCOUNTER — APPOINTMENT (OUTPATIENT)
Dept: NEUROSURGERY | Facility: HOSPITAL | Age: 40
End: 2025-06-12
Payer: COMMERCIAL

## 2025-06-13 ENCOUNTER — HOSPITAL ENCOUNTER (OUTPATIENT)
Dept: NEUROLOGY | Facility: HOSPITAL | Age: 40
Setting detail: OUTPATIENT SURGERY
Discharge: HOME | End: 2025-06-13
Payer: COMMERCIAL

## 2025-06-13 ENCOUNTER — ANESTHESIA (OUTPATIENT)
Dept: OPERATING ROOM | Facility: HOSPITAL | Age: 40
End: 2025-06-13
Payer: COMMERCIAL

## 2025-06-13 ENCOUNTER — HOSPITAL ENCOUNTER (OUTPATIENT)
Facility: HOSPITAL | Age: 40
Setting detail: OUTPATIENT SURGERY
Discharge: HOME | End: 2025-06-13
Attending: STUDENT IN AN ORGANIZED HEALTH CARE EDUCATION/TRAINING PROGRAM | Admitting: STUDENT IN AN ORGANIZED HEALTH CARE EDUCATION/TRAINING PROGRAM
Payer: COMMERCIAL

## 2025-06-13 VITALS
HEIGHT: 76 IN | TEMPERATURE: 97.5 F | DIASTOLIC BLOOD PRESSURE: 64 MMHG | BODY MASS INDEX: 27.38 KG/M2 | WEIGHT: 224.87 LBS | HEART RATE: 60 BPM | SYSTOLIC BLOOD PRESSURE: 129 MMHG | RESPIRATION RATE: 16 BRPM | OXYGEN SATURATION: 100 %

## 2025-06-13 DIAGNOSIS — G96.191 PERINEURAL CYST: Primary | ICD-10-CM

## 2025-06-13 LAB
ABO GROUP (TYPE) IN BLOOD: NORMAL
RH FACTOR (ANTIGEN D): NORMAL

## 2025-06-13 PROCEDURE — 2500000004 HC RX 250 GENERAL PHARMACY W/ HCPCS (ALT 636 FOR OP/ED): Performed by: ANESTHESIOLOGY

## 2025-06-13 PROCEDURE — 2780000003 HC OR 278 NO HCPCS: Performed by: STUDENT IN AN ORGANIZED HEALTH CARE EDUCATION/TRAINING PROGRAM

## 2025-06-13 PROCEDURE — 95926 SOMATOSENSORY TESTING: CPT

## 2025-06-13 PROCEDURE — 2500000004 HC RX 250 GENERAL PHARMACY W/ HCPCS (ALT 636 FOR OP/ED): Performed by: ANESTHESIOLOGIST ASSISTANT

## 2025-06-13 PROCEDURE — 2500000001 HC RX 250 WO HCPCS SELF ADMINISTERED DRUGS (ALT 637 FOR MEDICARE OP): Performed by: STUDENT IN AN ORGANIZED HEALTH CARE EDUCATION/TRAINING PROGRAM

## 2025-06-13 PROCEDURE — 7100000010 HC PHASE TWO TIME - EACH INCREMENTAL 1 MINUTE: Performed by: STUDENT IN AN ORGANIZED HEALTH CARE EDUCATION/TRAINING PROGRAM

## 2025-06-13 PROCEDURE — 3700000002 HC GENERAL ANESTHESIA TIME - EACH INCREMENTAL 1 MINUTE: Performed by: STUDENT IN AN ORGANIZED HEALTH CARE EDUCATION/TRAINING PROGRAM

## 2025-06-13 PROCEDURE — 3600000009 HC OR TIME - EACH INCREMENTAL 1 MINUTE - PROCEDURE LEVEL FOUR: Performed by: STUDENT IN AN ORGANIZED HEALTH CARE EDUCATION/TRAINING PROGRAM

## 2025-06-13 PROCEDURE — 64708 REVISE ARM/LEG NERVE: CPT | Performed by: STUDENT IN AN ORGANIZED HEALTH CARE EDUCATION/TRAINING PROGRAM

## 2025-06-13 PROCEDURE — 36415 COLL VENOUS BLD VENIPUNCTURE: CPT | Performed by: STUDENT IN AN ORGANIZED HEALTH CARE EDUCATION/TRAINING PROGRAM

## 2025-06-13 PROCEDURE — 2500000004 HC RX 250 GENERAL PHARMACY W/ HCPCS (ALT 636 FOR OP/ED): Performed by: STUDENT IN AN ORGANIZED HEALTH CARE EDUCATION/TRAINING PROGRAM

## 2025-06-13 PROCEDURE — 2720000007 HC OR 272 NO HCPCS: Performed by: STUDENT IN AN ORGANIZED HEALTH CARE EDUCATION/TRAINING PROGRAM

## 2025-06-13 PROCEDURE — 64727 INTERNAL NEUROLYSIS: CPT | Performed by: STUDENT IN AN ORGANIZED HEALTH CARE EDUCATION/TRAINING PROGRAM

## 2025-06-13 PROCEDURE — 7100000009 HC PHASE TWO TIME - INITIAL BASE CHARGE: Performed by: STUDENT IN AN ORGANIZED HEALTH CARE EDUCATION/TRAINING PROGRAM

## 2025-06-13 PROCEDURE — 2500000005 HC RX 250 GENERAL PHARMACY W/O HCPCS: Performed by: STUDENT IN AN ORGANIZED HEALTH CARE EDUCATION/TRAINING PROGRAM

## 2025-06-13 PROCEDURE — 3600000004 HC OR TIME - INITIAL BASE CHARGE - PROCEDURE LEVEL FOUR: Performed by: STUDENT IN AN ORGANIZED HEALTH CARE EDUCATION/TRAINING PROGRAM

## 2025-06-13 PROCEDURE — 3700000001 HC GENERAL ANESTHESIA TIME - INITIAL BASE CHARGE: Performed by: STUDENT IN AN ORGANIZED HEALTH CARE EDUCATION/TRAINING PROGRAM

## 2025-06-13 PROCEDURE — 7100000002 HC RECOVERY ROOM TIME - EACH INCREMENTAL 1 MINUTE: Performed by: STUDENT IN AN ORGANIZED HEALTH CARE EDUCATION/TRAINING PROGRAM

## 2025-06-13 PROCEDURE — 2500000005 HC RX 250 GENERAL PHARMACY W/O HCPCS: Performed by: ANESTHESIOLOGIST ASSISTANT

## 2025-06-13 PROCEDURE — 7100000001 HC RECOVERY ROOM TIME - INITIAL BASE CHARGE: Performed by: STUDENT IN AN ORGANIZED HEALTH CARE EDUCATION/TRAINING PROGRAM

## 2025-06-13 RX ORDER — FENTANYL CITRATE 50 UG/ML
50 INJECTION, SOLUTION INTRAMUSCULAR; INTRAVENOUS EVERY 5 MIN PRN
Status: DISCONTINUED | OUTPATIENT
Start: 2025-06-13 | End: 2025-06-13 | Stop reason: HOSPADM

## 2025-06-13 RX ORDER — LIDOCAINE HYDROCHLORIDE 20 MG/ML
INJECTION, SOLUTION INFILTRATION; PERINEURAL AS NEEDED
Status: DISCONTINUED | OUTPATIENT
Start: 2025-06-13 | End: 2025-06-13

## 2025-06-13 RX ORDER — PHENYLEPHRINE HCL IN 0.9% NACL 0.4MG/10ML
SYRINGE (ML) INTRAVENOUS AS NEEDED
Status: DISCONTINUED | OUTPATIENT
Start: 2025-06-13 | End: 2025-06-13

## 2025-06-13 RX ORDER — SODIUM CHLORIDE, SODIUM LACTATE, POTASSIUM CHLORIDE, CALCIUM CHLORIDE 600; 310; 30; 20 MG/100ML; MG/100ML; MG/100ML; MG/100ML
100 INJECTION, SOLUTION INTRAVENOUS CONTINUOUS
Status: DISCONTINUED | OUTPATIENT
Start: 2025-06-13 | End: 2025-06-13 | Stop reason: HOSPADM

## 2025-06-13 RX ORDER — FENTANYL CITRATE 50 UG/ML
INJECTION, SOLUTION INTRAMUSCULAR; INTRAVENOUS AS NEEDED
Status: DISCONTINUED | OUTPATIENT
Start: 2025-06-13 | End: 2025-06-13

## 2025-06-13 RX ORDER — ACETAMINOPHEN 325 MG/1
975 TABLET ORAL ONCE
Status: COMPLETED | OUTPATIENT
Start: 2025-06-13 | End: 2025-06-13

## 2025-06-13 RX ORDER — SODIUM CHLORIDE, SODIUM LACTATE, POTASSIUM CHLORIDE, CALCIUM CHLORIDE 600; 310; 30; 20 MG/100ML; MG/100ML; MG/100ML; MG/100ML
INJECTION, SOLUTION INTRAVENOUS CONTINUOUS PRN
Status: DISCONTINUED | OUTPATIENT
Start: 2025-06-13 | End: 2025-06-13

## 2025-06-13 RX ORDER — FENTANYL CITRATE 50 UG/ML
12.5 INJECTION, SOLUTION INTRAMUSCULAR; INTRAVENOUS EVERY 5 MIN PRN
Status: DISCONTINUED | OUTPATIENT
Start: 2025-06-13 | End: 2025-06-13 | Stop reason: HOSPADM

## 2025-06-13 RX ORDER — NORETHINDRONE AND ETHINYL ESTRADIOL 0.5-0.035
KIT ORAL AS NEEDED
Status: DISCONTINUED | OUTPATIENT
Start: 2025-06-13 | End: 2025-06-13

## 2025-06-13 RX ORDER — PROPOFOL 10 MG/ML
INJECTION, EMULSION INTRAVENOUS AS NEEDED
Status: DISCONTINUED | OUTPATIENT
Start: 2025-06-13 | End: 2025-06-13

## 2025-06-13 RX ORDER — LIDOCAINE HYDROCHLORIDE 10 MG/ML
0.1 INJECTION, SOLUTION INFILTRATION; PERINEURAL ONCE
Status: DISCONTINUED | OUTPATIENT
Start: 2025-06-13 | End: 2025-06-13 | Stop reason: HOSPADM

## 2025-06-13 RX ORDER — FENTANYL CITRATE 50 UG/ML
25 INJECTION, SOLUTION INTRAMUSCULAR; INTRAVENOUS EVERY 5 MIN PRN
Status: DISCONTINUED | OUTPATIENT
Start: 2025-06-13 | End: 2025-06-13 | Stop reason: HOSPADM

## 2025-06-13 RX ORDER — MIDAZOLAM HYDROCHLORIDE 1 MG/ML
INJECTION INTRAMUSCULAR; INTRAVENOUS AS NEEDED
Status: DISCONTINUED | OUTPATIENT
Start: 2025-06-13 | End: 2025-06-13

## 2025-06-13 RX ORDER — ONDANSETRON HYDROCHLORIDE 2 MG/ML
INJECTION, SOLUTION INTRAVENOUS AS NEEDED
Status: DISCONTINUED | OUTPATIENT
Start: 2025-06-13 | End: 2025-06-13

## 2025-06-13 RX ORDER — ONDANSETRON HYDROCHLORIDE 2 MG/ML
4 INJECTION, SOLUTION INTRAVENOUS ONCE AS NEEDED
Status: DISCONTINUED | OUTPATIENT
Start: 2025-06-13 | End: 2025-06-13 | Stop reason: HOSPADM

## 2025-06-13 RX ORDER — REMIFENTANIL HYDROCHLORIDE 1 MG/ML
INJECTION, POWDER, LYOPHILIZED, FOR SOLUTION INTRAVENOUS CONTINUOUS PRN
Status: DISCONTINUED | OUTPATIENT
Start: 2025-06-13 | End: 2025-06-13

## 2025-06-13 RX ORDER — AMOXICILLIN 250 MG
1 CAPSULE ORAL DAILY
Qty: 7 TABLET | Refills: 0 | Status: SHIPPED | OUTPATIENT
Start: 2025-06-13 | End: 2025-06-20

## 2025-06-13 RX ORDER — ROCURONIUM BROMIDE 10 MG/ML
INJECTION, SOLUTION INTRAVENOUS AS NEEDED
Status: DISCONTINUED | OUTPATIENT
Start: 2025-06-13 | End: 2025-06-13

## 2025-06-13 RX ORDER — CELECOXIB 200 MG/1
400 CAPSULE ORAL ONCE
Status: COMPLETED | OUTPATIENT
Start: 2025-06-13 | End: 2025-06-13

## 2025-06-13 RX ORDER — OXYCODONE HYDROCHLORIDE 5 MG/1
5 TABLET ORAL EVERY 6 HOURS PRN
Qty: 28 TABLET | Refills: 0 | Status: SHIPPED | OUTPATIENT
Start: 2025-06-13 | End: 2025-06-20

## 2025-06-13 RX ORDER — CEFAZOLIN 1 G/1
INJECTION, POWDER, FOR SOLUTION INTRAVENOUS AS NEEDED
Status: DISCONTINUED | OUTPATIENT
Start: 2025-06-13 | End: 2025-06-13

## 2025-06-13 RX ORDER — PROCHLORPERAZINE EDISYLATE 5 MG/ML
5 INJECTION INTRAMUSCULAR; INTRAVENOUS ONCE AS NEEDED
Status: DISCONTINUED | OUTPATIENT
Start: 2025-06-13 | End: 2025-06-13 | Stop reason: HOSPADM

## 2025-06-13 RX ORDER — GLYCOPYRROLATE 0.2 MG/ML
INJECTION INTRAMUSCULAR; INTRAVENOUS AS NEEDED
Status: DISCONTINUED | OUTPATIENT
Start: 2025-06-13 | End: 2025-06-13

## 2025-06-13 RX ADMIN — FENTANYL CITRATE 100 MCG: 50 INJECTION, SOLUTION INTRAMUSCULAR; INTRAVENOUS at 07:37

## 2025-06-13 RX ADMIN — FENTANYL CITRATE 25 MCG: 50 INJECTION INTRAMUSCULAR; INTRAVENOUS at 11:49

## 2025-06-13 RX ADMIN — ACETAMINOPHEN 975 MG: 325 TABLET, FILM COATED ORAL at 07:21

## 2025-06-13 RX ADMIN — PROPOFOL 50 MCG/KG/MIN: 10 INJECTION, EMULSION INTRAVENOUS at 08:05

## 2025-06-13 RX ADMIN — MIDAZOLAM HYDROCHLORIDE 2 MG: 2 INJECTION, SOLUTION INTRAMUSCULAR; INTRAVENOUS at 07:34

## 2025-06-13 RX ADMIN — EPHEDRINE SULFATE 5 MG: 50 INJECTION INTRAVENOUS at 08:41

## 2025-06-13 RX ADMIN — REMIFENTANIL HYDROCHLORIDE 0.03 MCG/KG/MIN: 1 INJECTION, POWDER, LYOPHILIZED, FOR SOLUTION INTRAVENOUS at 07:42

## 2025-06-13 RX ADMIN — SUGAMMADEX 200 MG: 100 INJECTION, SOLUTION INTRAVENOUS at 10:43

## 2025-06-13 RX ADMIN — DEXAMETHASONE SODIUM PHOSPHATE 4 MG: 4 INJECTION INTRA-ARTICULAR; INTRALESIONAL; INTRAMUSCULAR; INTRAVENOUS; SOFT TISSUE at 07:47

## 2025-06-13 RX ADMIN — GLYCOPYRROLATE 0.1 MG: 0.2 INJECTION INTRAMUSCULAR; INTRAVENOUS at 07:34

## 2025-06-13 RX ADMIN — ROCURONIUM BROMIDE 50 MG: 10 INJECTION INTRAVENOUS at 07:38

## 2025-06-13 RX ADMIN — EPHEDRINE SULFATE 5 MG: 50 INJECTION INTRAVENOUS at 08:29

## 2025-06-13 RX ADMIN — Medication 80 MCG: at 08:24

## 2025-06-13 RX ADMIN — EPHEDRINE SULFATE 10 MG: 50 INJECTION INTRAVENOUS at 09:05

## 2025-06-13 RX ADMIN — FENTANYL CITRATE 25 MCG: 50 INJECTION INTRAMUSCULAR; INTRAVENOUS at 11:37

## 2025-06-13 RX ADMIN — POVIDONE-IODINE 1 APPLICATION: 5 SOLUTION TOPICAL at 07:21

## 2025-06-13 RX ADMIN — FENTANYL CITRATE 25 MCG: 50 INJECTION INTRAMUSCULAR; INTRAVENOUS at 11:26

## 2025-06-13 RX ADMIN — EPHEDRINE SULFATE 10 MG: 50 INJECTION INTRAVENOUS at 09:24

## 2025-06-13 RX ADMIN — LIDOCAINE HYDROCHLORIDE 100 MG: 20 INJECTION, SOLUTION INFILTRATION; PERINEURAL at 07:37

## 2025-06-13 RX ADMIN — CEFAZOLIN 2 G: 330 INJECTION, POWDER, FOR SOLUTION INTRAMUSCULAR; INTRAVENOUS at 07:47

## 2025-06-13 RX ADMIN — ONDANSETRON 4 MG: 2 INJECTION INTRAMUSCULAR; INTRAVENOUS at 10:37

## 2025-06-13 RX ADMIN — CELECOXIB 400 MG: 200 CAPSULE ORAL at 07:21

## 2025-06-13 RX ADMIN — SODIUM CHLORIDE, SODIUM LACTATE, POTASSIUM CHLORIDE, AND CALCIUM CHLORIDE: .6; .31; .03; .02 INJECTION, SOLUTION INTRAVENOUS at 07:26

## 2025-06-13 RX ADMIN — GLYCOPYRROLATE 0.2 MG: 0.2 INJECTION INTRAMUSCULAR; INTRAVENOUS at 08:24

## 2025-06-13 RX ADMIN — Medication 80 MCG: at 07:38

## 2025-06-13 RX ADMIN — EPHEDRINE SULFATE 5 MG: 50 INJECTION INTRAVENOUS at 08:48

## 2025-06-13 RX ADMIN — PROPOFOL 200 MG: 10 INJECTION, EMULSION INTRAVENOUS at 07:37

## 2025-06-13 RX ADMIN — PROPOFOL 50 MG: 10 INJECTION, EMULSION INTRAVENOUS at 08:08

## 2025-06-13 SDOH — HEALTH STABILITY: MENTAL HEALTH: CURRENT SMOKER: 0

## 2025-06-13 ASSESSMENT — PAIN - FUNCTIONAL ASSESSMENT
PAIN_FUNCTIONAL_ASSESSMENT: 0-10

## 2025-06-13 ASSESSMENT — COLUMBIA-SUICIDE SEVERITY RATING SCALE - C-SSRS
6. HAVE YOU EVER DONE ANYTHING, STARTED TO DO ANYTHING, OR PREPARED TO DO ANYTHING TO END YOUR LIFE?: NO
2. HAVE YOU ACTUALLY HAD ANY THOUGHTS OF KILLING YOURSELF?: NO
1. IN THE PAST MONTH, HAVE YOU WISHED YOU WERE DEAD OR WISHED YOU COULD GO TO SLEEP AND NOT WAKE UP?: NO

## 2025-06-13 ASSESSMENT — PAIN SCALES - GENERAL
PAINLEVEL_OUTOF10: 5 - MODERATE PAIN
PAINLEVEL_OUTOF10: 6
PAINLEVEL_OUTOF10: 6
PAINLEVEL_OUTOF10: 4
PAINLEVEL_OUTOF10: 6
PAINLEVEL_OUTOF10: 4
PAINLEVEL_OUTOF10: 5 - MODERATE PAIN
PAINLEVEL_OUTOF10: 4
PAINLEVEL_OUTOF10: 6
PAINLEVEL_OUTOF10: 0 - NO PAIN

## 2025-06-13 ASSESSMENT — PAIN DESCRIPTION - ORIENTATION: ORIENTATION: RIGHT

## 2025-06-13 ASSESSMENT — PAIN DESCRIPTION - LOCATION: LOCATION: LEG

## 2025-06-13 NOTE — ANESTHESIA PROCEDURE NOTES
Airway  Date/Time: 6/13/2025 7:41 AM  Reason: elective    Airway not difficult    Staffing  Performed: SUNNY   Authorized by: Jerson Calero MD    Performed by: HALI Marquez Capp  Patient location during procedure: OR    Patient Condition  Indications for airway management: airway protection and anesthesia  Sedation level: deep     Final Airway Details   Preoxygenated: yes  Final airway type: endotracheal airway  Successful airway: ETT  Cuffed: yes   Successful intubation technique: direct laryngoscopy  Adjuncts used in placement: intubating stylet and cricoid pressure  Endotracheal tube insertion site: oral  Blade: Tessa  Blade size: #4  ETT size (mm): 7.5  Cormack-Lehane Classification: grade I - full view of glottis  Placement verified by: chest auscultation and capnometry   Measured from: gums  ETT to gums (cm): 23  Number of attempts at approach: 1  Number of other approaches attempted: 0    Additional Comments  Atraumatic Intubation. Lips and teeth in preanesthetic condition.

## 2025-06-13 NOTE — BRIEF OP NOTE
Date: 2025  OR Location: [facility] OR    Name: David Cummins, : 1985, Age: 39 y.o., MRN: [id number], Sex: male    Diagnosis  Pre-op Diagnosis      * Perineural cyst [G96.191] Post-op Diagnosis     * Perineural cyst [G96.191]     Procedures  Peroneal Nerve Decompression & Articular Branch Neurectomy for Intraneural Ganglion Cyst  54031 - [facility] NEURP MAJOR PRPH NRV ARM/LEG OPN OTH/THN SPEC    VA INTERNAL NEUROLYSIS REQ OPERATING MICROSCOPE [95899]  VA EXCISION LESION TENDON SHEATH/CAPSULE LEG&/ANK [87905]  VA MICROSURG TQS REQ USE OPERATING MICROSCOPE [10234]  Surgeons      * Hernesto Kelsey - Primary    Resident/Fellow/Other Assistant:  Surgeons and Role:     * Clarence Hunter MD - Resident - Assisting    Staff:   Circulator: Ron Awan Person: Homero    Anesthesia Staff: Anesthesiologist: Jerson Calero MD  C-AA: HALI Marquez Capp  SUNNY: Addison Duran    Procedure Summary  Anesthesia: General  ASA: II  Estimated Blood Loss: 25 mL  Intra-op Medications:   Administrations occurring from 0705 to 1110 on 25:   Medication Name Total Dose   polymyxin B 500,000 Units in sodium chloride 0.9% 1,000 mL irrigation 500,000 Units   acetaminophen (Tylenol) tablet 975 mg 975 mg   celecoxib (CeleBREX) capsule 400 mg 400 mg   povidone-iodine 5 % kit kit 1 Application   ceFAZolin (Ancef) vial 1 g 2 g   dexAMETHasone (Decadron) 4 mg/mL IV Syringe 2 mL 4 mg   ePHEDrine injection 35 mg   fentaNYL (Sublimaze) injection 50 mcg/mL 100 mcg   glycopyrrolate (Robinul) injection 0.3 mg   lactated Ringer's infusion Cannot be calculated   lidocaine (Xylocaine) injection 2 % 100 mg   midazolam PF (Versed) injection 1 mg/mL 2 mg   ondansetron (Zofran) 2 mg/mL injection 4 mg   phenylephrine 40 mcg/mL syringe 10 mL 160 mcg   propofol (Diprivan) injection 10 mg/mL 1,066 mg   remifentanil (Ultiva) 1,000 mcg in sodium chloride 0.9% 50 mL (20 mcg/mL) infusion 1.52 mg   rocuronium (ZeMuron) 50 mg/5 mL injection 50 mg   sugammadex  (Bridion) 200 mg/2 mL injection 200 mg              Anesthesia Record               Intraprocedure I/O Totals          Intake    Remifentanil Drip 0.00 mL    The total shown is the total volume documented since Anesthesia Start was filed.    lactated Ringer's 700.00 mL    Total Intake 700 mL       Output    Est. Blood Loss 25 mL    Total Output 25 mL       Net    Net Volume 675 mL          Specimen: No specimens collected     Findings: Grossly enlarged CPN originating from the articular branch. Good internal decompression following cyst fenestration and manual extrusion of the synovial fluid along the extent of the enlarged nerve.     Complications:  None; patient tolerated the procedure well.     Disposition: PACU - hemodynamically stable.  Condition: stable  Specimens Collected: No specimens collected  Attending Attestation:     Hernesto Kelsey  Phone Number: [phone]

## 2025-06-13 NOTE — ANESTHESIA PROCEDURE NOTES
Peripheral IV  Date/Time: 6/13/2025 7:46 AM  Inserted by: Addison Duran    Placement  Needle size: 18 G  Laterality: left  Location: hand  Local anesthetic: none  Site prep: alcohol  Attempts: 1         - - -

## 2025-06-13 NOTE — ANESTHESIA PREPROCEDURE EVALUATION
Patient: David Cummins    Procedure Information       Date/Time: 06/13/25 0705    Procedure: Peroneal Nerve Decompression & Articular Branch Neurectomy for Intraneural Ganglion Cyst (Right)    Location: [facility] OR 10 / [facility] OR    Surgeons: Hernesto Kelsey MD PhD          The patient is a 39 year old male with acute RIGHT foot drop. 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 He is referred today by Dr. Hernesto Kelsey for perioperative evaluation in anticipation of Peroneal Nerve Decompression & Articular Branch Neurectomy for Intraneural Ganglion Cyst - Right on 6/13/25.       Relevant Problems   Neuro   (+) Perineural cyst     Vitals:    06/13/25 0630   BP: 140/75   Pulse: 62   Temp: 36 °C (96.8 °F)   SpO2: 99%       Surgical History[1]  Medical History[2]  Current Medications[3]  Prior to Admission medications    Medication Sig Start Date End Date Taking? Authorizing Provider   chlorhexidine (Hibiclens) 4 % external liquid Use as directed daily preoperatively 6/6/25  Yes NILSA Selby   chlorhexidine (Peridex) 0.12 % solution Swish and spit with 15ml of solution the night before and morning of surgery. Do not swallow. 6/6/25  Yes NILSA Selby   cyclobenzaprine (Flexeril) 10 mg tablet Take 1 tablet (10 mg) by mouth 3 times a day as needed for muscle spasms. 6/5/24 6/13/25 Yes Ron Abdul, DO   gabapentin (Neurontin) 300 mg capsule 1 capsule each evening for two days, then 1 capsule twice daily for two days, then one capsule three times daily  Patient not taking: Reported on 5/27/2025 4/3/25 6/13/25  Ron Marte MD     RX Allergies[4]  Social History     Tobacco Use    Smoking status: Never    Smokeless tobacco: Never   Substance Use Topics    Alcohol use: Yes     Alcohol/week: 10.0 standard drinks of alcohol     Types: 10 Cans of beer per week         Chemistry   "  Lab Results   Component Value Date/Time     06/06/2025 0854    K 4.4 06/06/2025 0854     06/06/2025 0854    CO2 27 06/06/2025 0854    BUN 16 06/06/2025 0854    CREATININE 1.02 06/06/2025 0854    Lab Results   Component Value Date/Time    CALCIUM 9.6 06/06/2025 0854    ALKPHOS 42 03/08/2025 0355    AST 15 03/08/2025 0355    ALT 12 03/08/2025 0355    BILITOT 0.4 03/08/2025 0355          Lab Results   Component Value Date/Time    WBC 5.3 06/06/2025 0854    HGB 14.2 06/06/2025 0854    HCT 41.1 06/06/2025 0854     06/06/2025 0854     No results found for: \"PROTIME\", \"PTT\", \"INR\"  Encounter Date: 01/23/25   ECG 12 lead (Clinic Performed)    Narrative    Sinus bradycardia rate 51 ST-T wave elevation.       No results found for this or any previous visit from the past 1095 days.\  Clinical information reviewed:   Tobacco  Allergies  Meds   Med Hx  Surg Hx   Fam Hx  Soc Hx        NPO Detail:  NPO/Void Status  Carbohydrate Drink Given Prior to Surgery? : N  Date of Last Liquid: 06/12/25  Time of Last Liquid: 2100  Date of Last Solid: 06/12/25  Time of Last Solid: 2100  Last Intake Type: Clear fluids  Time of Last Void: 0617         Physical Exam    Airway  Mallampati: I  TM distance: >3 FB  Neck ROM: full  Mouth opening: 3 or more finger widths     Cardiovascular    Dental    Pulmonary    Abdominal            Anesthesia Plan    History of general anesthesia?: yes  History of complications of general anesthesia?: no    ASA 2     general     The patient is not a current smoker.  Patient did not smoke on day of procedure.    intravenous induction   Postoperative pain plan includes opioids.    Plan discussed with CAA.           [1]   Past Surgical History:  Procedure Laterality Date    NO PAST SURGERIES     [2]   Past Medical History:  Diagnosis Date    Foot drop, right     Peroneal neuropathy, right    [3]   Current Facility-Administered Medications:     acetaminophen (Tylenol) tablet 975 mg, 975 mg, " oral, Once, Hernesto Kelsey MD PhD    celecoxib (CeleBREX) capsule 400 mg, 400 mg, oral, Once, Hernesto Kelsey MD PhD    povidone-iodine 5 % kit kit, , Topical, Once, Hernesto Kelsey MD PhD  [4] No Known Allergies

## 2025-06-13 NOTE — DISCHARGE SUMMARY
Discharge Diagnosis  Perineural cyst    Issues Requiring Follow-Up  Incisional follow up    Hospital Course   Patient is a 39 year old male p/w peroneal nerve dysfunction    6/13 s/p right peroneal nerve decompression and ganglion cyst fenestration with sacrifice of articular branch    Visit Vitals  /75   Pulse 62   Temp 36.3 °C (97.3 °F) (Axillary)   Resp 12     Vitals:    06/13/25 0630   Weight: 102 kg (224 lb 13.9 oz)       Immunization History   Administered Date(s) Administered    Moderna SARS-CoV-2 Vaccination 08/12/2021, 09/09/2021    Tdap vaccine, age 7 year and older (BOOSTRIX, ADACEL) 01/27/2022         Pertinent Physical Exam At Time of Discharge  Ox3  BUE D/B/T/HG/IO 5  LLE HF/KE/DF/PF/EHL 5  LLE HF/KE5 DF2 PF5 EHL 2  SILT b/l  Incision c/d/I with glue      Home Medications     Medication List      START taking these medications     oxyCODONE 5 mg immediate release tablet; Commonly known as: Roxicodone;   Take 1 tablet (5 mg) by mouth every 6 hours if needed for severe pain (7 -   10) for up to 7 days.   sennosides-docusate sodium 8.6-50 mg tablet; Commonly known as:   Ngozi-Colace; Take 1 tablet by mouth once daily for 7 days.     STOP taking these medications     chlorhexidine 0.12 % solution; Commonly known as: Peridex   chlorhexidine 4 % external liquid; Commonly known as: Hibiclens       Outpatient Follow-Up  Future Appointments   Date Time Provider Department Center   7/1/2025  1:00 PM Samantha A Meeson, APRN-CNP [id number] [place]   7/10/2025  9:15 AM Ron Marte MD [id number] [place]   9/16/2025  1:00 PM Hernesto Kelsey MD PhD [id number] [place]       Clarence Hunter MD

## 2025-06-13 NOTE — ANESTHESIA POSTPROCEDURE EVALUATION
Patient: David Cummins    Procedure Summary       Date: 06/13/25 Room / Location: [facility] OR 10 / [facility] OR    Anesthesia Start: 0726 Anesthesia Stop: 1119    Procedure: Peroneal Nerve Decompression & Articular Branch Neurectomy for Intraneural Ganglion Cyst (Right) Diagnosis:       Perineural cyst      (Perineural cyst [G96.191])    Surgeons: Hernesto Kelsey MD PhD Responsible Provider: Jerson Calero MD    Anesthesia Type: general ASA Status: 2            Anesthesia Type: general    Vitals Value Taken Time   /75 06/13/25 11:19   Temp 36.1 06/13/25 11:19   Pulse 104 06/13/25 11:16   Resp 14 06/13/25 11:19   SpO2 97 % 06/13/25 11:16   Vitals shown include unfiled device data.    Anesthesia Post Evaluation    Patient location during evaluation: PACU  Patient participation: complete - patient participated  Level of consciousness: awake and alert  Pain management: adequate  Multimodal analgesia pain management approach  Airway patency: patent  Two or more strategies used to mitigate risk of obstructive sleep apnea  Cardiovascular status: acceptable, hemodynamically stable and blood pressure returned to baseline  Respiratory status: acceptable, spontaneous ventilation and face mask  Hydration status: acceptable  Postoperative Nausea and Vomiting: none        There were no known notable events for this encounter.

## 2025-06-15 NOTE — OP NOTE
Peroneal Nerve Decompression & Articular Branch Neurectomy for Intraneural Ganglion Cyst (R) Operative Note     Date: 2025  OR Location: [facility] OR    Name: David Cummins, : 1985, Age: 39 y.o., MRN: [id number], Sex: male    Diagnosis  Pre-op Diagnosis      * Perineural cyst [G96.191] Post-op Diagnosis     * Perineural cyst [G96.191]     Procedures  Peroneal Nerve Decompression & Articular Branch Neurectomy for Intraneural Ganglion Cyst    73616 - MS NEURP MAJOR PRPH NRV ARM/LEG OPN OTH/THN SPEC   MS INTERNAL NEUROLYSIS REQ OPERATING MICROSCOPE [19076]  73891 neurectomy - articular branch     Surgeons      * Hernesto Kelsey - Primary    Resident/Fellow/Other Assistant:  Surgeons and Role:     * Clarence Hunter MD - Resident - Assisting    Staff:   Lorulator: Ron Awan Person: Homero    Anesthesia Staff: Anesthesiologist: Jerson Calero MD  C-AA: HALI Marquez Capp  SUNNY: Addison Duran    Procedure Summary  Anesthesia: General  ASA: II  Estimated Blood Loss: 25 mL  Intra-op Medications:   Administrations occurring from 0705 to 1110 on 25:   Medication Name Total Dose   polymyxin B 500,000 Units in sodium chloride 0.9% 1,000 mL irrigation 500,000 Units   acetaminophen (Tylenol) tablet 975 mg 975 mg   celecoxib (CeleBREX) capsule 400 mg 400 mg   povidone-iodine 5 % kit kit 1 Application   ceFAZolin (Ancef) vial 1 g 2 g   dexAMETHasone (Decadron) 4 mg/mL IV Syringe 2 mL 4 mg   ePHEDrine injection 35 mg   fentaNYL (Sublimaze) injection 50 mcg/mL 100 mcg   glycopyrrolate (Robinul) injection 0.3 mg   lactated Ringer's infusion Cannot be calculated   lidocaine (Xylocaine) injection 2 % 100 mg   midazolam PF (Versed) injection 1 mg/mL 2 mg   ondansetron (Zofran) 2 mg/mL injection 4 mg   phenylephrine 40 mcg/mL syringe 10 mL 160 mcg   propofol (Diprivan) injection 10 mg/mL 1,066 mg   remifentanil (Ultiva) 1,000 mcg in sodium chloride 0.9% 50 mL (20 mcg/mL) infusion 1.52 mg   rocuronium (ZeMuron) 50  mg/5 mL injection 50 mg   sugammadex (Bridion) 200 mg/2 mL injection 200 mg              Anesthesia Record               Intraprocedure I/O Totals          Intake    Remifentanil Drip 0.00 mL    The total shown is the total volume documented since Anesthesia Start was filed.    lactated Ringer's 700.00 mL    Total Intake 700 mL       Output    Est. Blood Loss 25 mL    Total Output 25 mL       Net    Net Volume 675 mL          Specimen: No specimens collected     Drains and/or Catheters: * None in log *    Implants: none    Informed Consent:  The risks, benefits, complications, and alternatives were discussed with the patient. I have explained the surgical procedure in detail with expected duration and extent of recovery along risks of surgery that include, but is not limited to bleeding, infection, blood vessel injury or damage, loss of sensation, loss of bladder, bowel or sexual function, nerve injury/damage resulting in weakness/paralysis, malunion, nonunion, CSF leak, brachial plexus injury, peripheral vision blindness, failure of implants/fusion, failure to relieve symptoms, recurrent disease, adjacent segment disease, need to reoperate for any reason and general anesthesia reaction such as stroke, coma, heart attack, delirium, confusion, death as well as worsening of preexisted medical conditions.     I clearly emphasized that while the goal of surgery is to decompress the spinal cord so as to ARREST the progression of neurological deficits - preexisting deficits may or may not improve after surgery. We discussed that many patients do clinically improve in functional and neurological outcomes following decompression of the spinal elements in patients but the extent of which is variable and depends on the severity of pain, numbness, tingling, or weakness. With improvement seen of those symptoms in that order. We did discuss the goal of surgery to alleviate pain first and foremost and hope for recovery of all  neurologic function with time.     All questions were answered and the patient was amenable to proceed.     INDICATIONS FOR THE PROCEDURE:   David Cummins is a 39 y.o. year old male who presents with acute RIGHT foot drop and found to have a ganglion cyst in the common peroneal nerve.       We discussed the risks of the procedure including hematoma and infection.  There is also a risk of further damage to the CPN which could result in additional deficits in motor and sensory function or neuropathic pain.       DESCRIPTION OF THE OPERATION:   Description of Procedure:   The patient was taken to the operating room and while on the stretcher vascular access was secured and endotracheal intubation was performed.  Running EMG was used throughout the case with a stimulation probe.     The patient was placed in the supine position and a bump was placed under her RIGHT hip and a foot post attached to the bed to maintain the knee bent at 90°. The patient was prepped and draped (extremity drape) in the standard sterile fashion. A curved incision was made with a 15-blade scalpel that curved along, posteriorly and inferiorly, the fibular head. The incision extended proximally to the popliteal crease and distally just anterior to the fibular head.  This was taken down through the subcutaneous tissues and the fascia over the origin of the peroneal muscles. The nerve was easily palpated just posterior to the fibular head and proximal to entry into the peroneal muscles. The CPN trunk was grossly enlarged with apparent translucent cysts.     Using pickups, bipolar electrocautery, and tenotomy scissors, we performed the external neurolysis of the common peroneal nerve. Proximally, we released all the fascial attachments and overlying connective tissue until normal CPN was identified. A sural nerve branch was also identified and looped to avoid injury. We The proximal CPN was tagged with a vessel loop. We then proceeded distally and  divided the fascia superficial to the peroneal longus muscle. Using bipolar electrocautery, we then divided the muscle over the CPN and its primary branches. We then completed the external neurolysis, dividing the deep fascia, and identifying the articular nerve branch that was grossly enlarged due to the cyst. Stimulation of the articular branch triggered an EMG response in tibialis anterior. Further distal neurolysis revealed obvious branching of the articular branch to the joint and another continuing to tibialis anterior. The articular branch was isolated and tagged with a vessel loop.     We then performed the cyst fenestration using a 15 blade over the translucent cyst into the epineurium along the longitudinal path of the nerve fibers. The tenotomy scissors were used to breakup any cystic pockets within the nerve. The synovial fluid of a thick jelly consistency was manually extruded from the nerve through the epineural slits. This was performed over the extent of the enlarged nerve.    We then tied off the articular nerve branch close to the entrance to the joint with 2 silk threads to prevent synovial fluid escape following transection of the nerve just proximal with tenotomy scissors.     At this point we irrigated and obtained meticulous hemostasis. The incision was closed with inverted 3-0 Vicryls followed by skin adhesive. The patient tolerated the procedure well was transferred to the PACU in stable condition.     Procedure Findings: Grossly enlarged CPN originating from the articular branch. Good internal decompression following cyst fenestration and manual extrusion of the synovial fluid along the extent of the enlarged nerve.      Estimated Blood Loss: 20 mL       Disposition: PACU - hemodynamically stable.    Condition: stable    Attending Attestation: I was present and scrubbed for the entire procedure.    Hernesto Kelsey MD, PhD  Attending Neurosurgeon, [facility]  System   of Neurological Surgery  [employer]  Office: [phone]  Fax: [phone]

## 2025-07-01 ENCOUNTER — APPOINTMENT (OUTPATIENT)
Dept: NEUROSURGERY | Facility: CLINIC | Age: 40
End: 2025-07-01
Payer: COMMERCIAL

## 2025-07-01 VITALS
TEMPERATURE: 97.8 F | WEIGHT: 224 LBS | DIASTOLIC BLOOD PRESSURE: 70 MMHG | HEART RATE: 71 BPM | SYSTOLIC BLOOD PRESSURE: 104 MMHG | BODY MASS INDEX: 27.28 KG/M2

## 2025-07-01 DIAGNOSIS — G57.31 PERONEAL NEUROPATHY, RIGHT: Primary | ICD-10-CM

## 2025-07-01 DIAGNOSIS — M67.40 GANGLION CYST: ICD-10-CM

## 2025-07-01 PROCEDURE — 99024 POSTOP FOLLOW-UP VISIT: CPT | Performed by: NURSE PRACTITIONER

## 2025-07-01 PROCEDURE — 1036F TOBACCO NON-USER: CPT | Performed by: NURSE PRACTITIONER

## 2025-07-01 ASSESSMENT — ENCOUNTER SYMPTOMS: OCCASIONAL FEELINGS OF UNSTEADINESS: 0

## 2025-07-01 ASSESSMENT — PATIENT HEALTH QUESTIONNAIRE - PHQ9
2. FEELING DOWN, DEPRESSED OR HOPELESS: NOT AT ALL
SUM OF ALL RESPONSES TO PHQ9 QUESTIONS 1 AND 2: 0
1. LITTLE INTEREST OR PLEASURE IN DOING THINGS: NOT AT ALL

## 2025-07-01 ASSESSMENT — PAIN SCALES - GENERAL: PAINLEVEL_OUTOF10: 1

## 2025-07-01 NOTE — PROGRESS NOTES
[facility] [place]  Department of Neurological Surgery  Post Operative Patient Visit      History of Present Illness:     David Cummins is a 39 y.o. year old male who presents to the spine clinic for postoperative visit following peroneal nerve decompression articular branch neurectomy for intraneural ganglion cyst on 06/13/2025 with Dr. Hernesto Kelsey at [facility].  Preoperatively the patient was experiencing some right foot drop as he was unable to dorsiflex his right foot.  This was also accompanied with pain in the right lower extremity.  Postoperatively he is starting to regain some strength in his right foot dorsiflexion and his preoperative pain is now resolving.  He states today he has no pain but does have tenderness surrounding the incision which I discussed with him his normal at this juncture in his healing journey.  He also states that he does still feel some numbness throughout his right shin.  The incision is clean dry and intact without signs and symptoms of infection.  Since the patient is starting to regain some of his function I have hope that he may fully regain function.  I am referring him to physical therapy to help assist him in recovering and to help him improve some of the exercises to help with that recovery.  We reviewed continued incisional care at today's appointment.  He will follow-up as scheduled with Dr. Kelsey on 09/16/2025.  In the meantime he will contact the office with any questions or concerns.    Chief Complaint   Patient presents with    Post-op     Patient is post op peroneal nerve surgery.          14/14 systems reviewed and negative other than what is listed in the history of present illness  Problem List[1]  Medical History[2]  Surgical History[3]  Social History     Tobacco Use    Smoking status: Never    Smokeless tobacco: Never   Substance Use Topics    Alcohol use: Yes     Alcohol/week: 10.0 standard drinks of alcohol     Types:  10 Cans of beer per week     family history includes Heart disease in his father; Hypertension in his father.  Current Medications[4]  Allergies[5]    Physical Examination:     General: Well developed, awake/alert/oriented x3, no distress, alert and cooperative  Skin: Warm and dry, no lesions, no rashes  Incision: Clean dry and intact without signs and symptoms of infection  ENMT: Mucous membranes moist, no apparent injury, no lesions seen  Head/Neck: Neck Supple, no apparent injury  Respiratory/Thorax: Normal breath sounds with good chest expansion, thorax symmetric  Cardiovascular: No pitting edema, no JVD    Motor Strength: 5/5 Throughout bilateral upper extremities and bilateral lower extremities with the exception of the right dorsiflexion being 4+/5    Muscle Bulk: Normal and symmetric in all extremities     Paraspinal muscle spasm/tenderness absent    Midline tenderness absent    Sensation to light touch intact    Results:     No new imaging this visit    Assessment and Plan:     David Cummins is a 39 y.o. year old male who presents to the spine clinic for postoperative visit following peroneal nerve decompression articular branch neurectomy for intraneural ganglion cyst on 06/13/2025 with Dr. Hernesto Kelsey at [facility].  Preoperatively the patient was experiencing some right foot drop as he was unable to dorsiflex his right foot.  This was also accompanied with pain in the right lower extremity.  Postoperatively he is starting to regain some strength in his right foot dorsiflexion and his preoperative pain is now resolving.  He states today he has no pain but does have tenderness surrounding the incision which I discussed with him his normal at this juncture in his healing journey.  He also states that he does still feel some numbness throughout his right shin.  The incision is clean dry and intact without signs and symptoms of infection.  Since the patient is starting to regain some of  his function I have hope that he may fully regain function.  I am referring him to physical therapy to help assist him in recovering and to help him improve some of the exercises to help with that recovery.  We reviewed continued incisional care at today's appointment.  He will follow-up as scheduled with Dr. Kelsey on 09/16/2025.  In the meantime he will contact the office with any questions or concerns.    Samantha Meeson, MSN, NP-C  Adult-Gerontology Associate Nurse Practitioner  Department of Neurosurgery- Spine [place]  Main phone [phone]  Fax [phone]             [1]   Patient Active Problem List  Diagnosis    Perineural cyst   [2]   Past Medical History:  Diagnosis Date    Foot drop, right     Peroneal neuropathy, right    [3]   Past Surgical History:  Procedure Laterality Date    NO PAST SURGERIES     [4] No current outpatient medications on file.  [5] No Known Allergies

## 2025-07-10 ENCOUNTER — APPOINTMENT (OUTPATIENT)
Dept: ORTHOPEDIC SURGERY | Facility: CLINIC | Age: 40
End: 2025-07-10
Payer: COMMERCIAL

## 2025-07-22 ENCOUNTER — EVALUATION (OUTPATIENT)
Dept: PHYSICAL THERAPY | Facility: CLINIC | Age: 40
End: 2025-07-22
Payer: COMMERCIAL

## 2025-07-22 DIAGNOSIS — M67.40 GANGLION CYST: ICD-10-CM

## 2025-07-22 DIAGNOSIS — G57.31 PERONEAL NEUROPATHY, RIGHT: Primary | ICD-10-CM

## 2025-07-22 PROCEDURE — 97161 PT EVAL LOW COMPLEX 20 MIN: CPT | Mod: GP

## 2025-07-22 PROCEDURE — 97110 THERAPEUTIC EXERCISES: CPT | Mod: GP

## 2025-07-22 ASSESSMENT — ENCOUNTER SYMPTOMS
LOSS OF SENSATION IN FEET: 0
OCCASIONAL FEELINGS OF UNSTEADINESS: 0
DEPRESSION: 0

## 2025-07-22 ASSESSMENT — COLUMBIA-SUICIDE SEVERITY RATING SCALE - C-SSRS
1. IN THE PAST MONTH, HAVE YOU WISHED YOU WERE DEAD OR WISHED YOU COULD GO TO SLEEP AND NOT WAKE UP?: NO
2. HAVE YOU ACTUALLY HAD ANY THOUGHTS OF KILLING YOURSELF?: NO
6. HAVE YOU EVER DONE ANYTHING, STARTED TO DO ANYTHING, OR PREPARED TO DO ANYTHING TO END YOUR LIFE?: NO

## 2025-07-22 ASSESSMENT — PATIENT HEALTH QUESTIONNAIRE - PHQ9
SUM OF ALL RESPONSES TO PHQ9 QUESTIONS 1 AND 2: 0
1. LITTLE INTEREST OR PLEASURE IN DOING THINGS: NOT AT ALL
2. FEELING DOWN, DEPRESSED OR HOPELESS: NOT AT ALL

## 2025-07-22 NOTE — PROGRESS NOTES
Physical Therapy    Physical Therapy Evaluation    Patient Name: David Cummins  MRN: [id number]  Today's Date: 7/22/2025  Time Calculation  Start Time: 1000  Stop Time: 1040  Time Calculation (min): 40 min     Problem List Items Addressed This Visit           ICD-10-CM    Peroneal neuropathy, right - Primary G57.31    Relevant Orders    Follow Up In Physical Therapy    Ganglion cyst M67.40    Relevant Orders    Follow Up In Physical Therapy         Insurance:  Visit number: 1   Insurance Type: Payor: [employer] / Plan: AET [employer] / Product Type: *No Product type* /   Authorization or Plan of Care date Range: no auth needed       General:  Reason for visit: s/p peroneal nerve decompression    Referred by: Meeson, S. Next MD appt:  9/16 neuro post op    Surgery Peroneal Nerve Decompression & Articular Branch Neurectomy for Intraneural Ganglion Cyst - Right, 6/13/2025. Days since surgery: 39      Precautions:  N/A  Fall Risk: None       Assessment:   Pt presents s/p R peroneal nerve decompression and articular branch neurectomy for intraneural ganglion cyst. Pt will benefit from skilled PT services to improve ROM, strength, stability, and overall functional capacity. Pt given HEP today and left the session with all questions answered.     Clinical Presentation: Stable and/or uncomplicated characteristics    Impairments: Active ROM, Strength, and Decreased functional level    Functional Limitations: Sport running, lifting     Recommended Treatment:  Therapeutic exercise, Manual therapy, Home program instruction and progression, Neuromuscular re-education, Therapeutic activities, Self care and home management, Instruction in activity modification, Electrical stimulation, and Vasopneumatic device + cold      Plan:  Plan of care was developed with input and agreement by the patient.  1x/week for 6 weeks       Rehab Potential:   Good to achieve goals.      Goals:  STG's (within 2 weeks)  David Cummins will  decrease pain by >/= 2/10 points from baseline to improve ability to perform household/recreational activities.    David Cummins will be able to sleep through the night with less than 2 interruptions due to pain in order to improve mental and physical well-being in order to safely participate in ADLs.     David Cummins will demonstrate independence,  excellent understanding of and report compliance with at least 3 exercises in his HEP to facilitate the learning process to maximize PT benefits and to facilitate independent rehab program upon discharge.    LTG's (by discharge)    David will improve R DF ROM by >/=5 deg to improve ability to perform household/recreational activities.     David will improve R DF and eversion strength to >/=4+/5 to improve ability to perform household/recreational activities.     David will demonstrate jogging for >/=10 minutes without discomfort or feelings of instability.      David Cummins will decrease pain to 0-2/10 to improve ability to perform household/recreational activities.    David Cummins will be able to sleep through the night without waking due to pain in order to improve mental and physical well-being in order to safely participate in ADLs.     David Cummins will demonstrate independence,  excellent understanding of and report compliance with HEP to facilitate the learning process to maximize PT benefits and to facilitate independent rehab program upon discharge.      Subjective:  CC:  Pt reports that he was on a run in March and felt intense pain in the lower leg. Found out that he had a intraneural ganglion cyst in the peroneal nerve. Had surgery on 6/13 for peroneal nerve decompression and intraneural ganglion cyst articular branch neurectomy. Previous had foot drop but now does not notice this as much. Reports that pain is little to none but he has numbness in the anterior lower leg. Is a long distance runner and would like to return to this. Has been lifting UE  and biking some but has not done any LE strengthening following surgery. Reports that he would do dynamic stretches as warm up prior to running but never had a good program. His goal is to increase strength in the LE and return to running without pain or instability.   JANET:  N/A  DOI: N/A  PAIN - Location: RLE   Worst(past 24 hours): 0  Least(past 24 hours): 0  Pain Quality: aching  PAIN - Alleviating: nothing  Aggravating: nothing  MEDICAL MANAGEMENT: Referred to Physical Therapy, Radiographs, MRI, Neurologist, and Rest  PLOF: Independent and pain free  FUNCTIONAL LIMITATIONS: Walking > 1 hour minutes and Sport running, lifting    LIVING ENVIRONMENT: multi-level house  WORK: working full time- sales   EXERCISE: running, biking, lifting   Patient Stated Goal: alleviate pain and restore function      Medical History Form: Reviewed (scanned into chart)      Objective:   Objective     Gait: normal     Squat: normal     Balance: 30s B SL stance     ROM: (WFL unless otherwise noted)   R hip flexion:   L hip flexion:  R hip abduction:  L hip abduction:  R hip extension:  L hip extension:     R knee flexion:  L knee flexion:  R knee extension:  L knee extension:    R ankle dorsiflexion: 2  L ankle dorsiflexion: 10  R ankle plantar flexion: 38  L ankle plantar flexion: 36  R ankle inversion: 22  L ankle inversion: 20  R ankle eversion: 12  L ankle eversion: 20       MMT:  R hip flexion: 5  L hip flexion: 5  R hip abduction: 5  L hip abduction: 5  R hip extension: 5  L hip extension: 5    R knee flexion: 5  L knee flexion: 5  R knee extension: 5  L knee extension: 5    R ankle dorsiflexion: 3  L ankle dorsiflexion: 5  R ankle plantar flexion: 5  L ankle plantar flexion: 5  R ankle inversion: 5  L ankle inversion: 5  R ankle eversion: 4  L ankle eversion: 5      Tenderness: around incision, posterior knee     Observations: incision clean and dry       Outcome Measures:  Other Measures  Lower Extremity Funtional Score (LEFS):  "64       Treatment Performed: (\"NP\" = Not Performed)     Therapeutic Exercise:     20 minutes  Home exercise program instructed and issued.  Discussed ankle ROM all directions and ankle ABCs throughout the day  Seated toe raises 2x10   Seated toe yoga x 2 min   Seated or long sitting four way ankle green TB 2x10 on R   Long sitting or seated ankle DF stretch with strap 2x30s   DF self mobilization on step 10s hold x 10   Three way hip red TB x 10 ea B     Manual Therapy:       minutes      Neuromuscular Re-education:      minutes      Gait Training:            minutes      Aquatics:            minutes      Therapeutic Activity:      minutes      Modalities:       Vasopneumatic Device       minutes  Electrical Stimulation          minutes  Ultrasound            minutes  Iontophoresis                     minutes  Cold Pack            minutes  Mechanical Traction           minutes    Self Care Home Management:    minutes    Canalith Reposition:          minutes     Education:          minutes    Other:       minutes      Evaluation Complexity: Low: 20 minutes; Moderate   minutes; Complex PT Evaluation Time Entry: 20;  minutes    Re-Evaluation:   minutes              "

## 2025-07-28 ENCOUNTER — TREATMENT (OUTPATIENT)
Dept: PHYSICAL THERAPY | Facility: CLINIC | Age: 40
End: 2025-07-28
Payer: COMMERCIAL

## 2025-07-28 DIAGNOSIS — G57.31 PERONEAL NEUROPATHY, RIGHT: Primary | ICD-10-CM

## 2025-07-28 DIAGNOSIS — M67.40 GANGLION CYST: ICD-10-CM

## 2025-07-28 PROCEDURE — 97032 APPL MODALITY 1+ESTIM EA 15: CPT | Mod: GP

## 2025-07-28 PROCEDURE — 97110 THERAPEUTIC EXERCISES: CPT | Mod: GP

## 2025-07-28 NOTE — PROGRESS NOTES
"                                                                                                                         PHYSICAL THERAPY TREATMENT NOTE    Patient Name:  David Cummins   Patient MRN: [id number]  Date: 07/28/25  Time Calculation  Start Time: 1215  Stop Time: 1300  Time Calculation (min): 45 min      Insurance:  2   Visit number: 2  Insurance Type: [employer]   Authorization or Plan of Care date Range: no auth needed       Therapy diagnoses:   Problem List Items Addressed This Visit           ICD-10-CM    Peroneal neuropathy, right - Primary G57.31    Ganglion cyst M67.40          General:  Reason for visit: s/p peroneal nerve decompression    Referred by: Meeson, S. Next MD appt:  9/16 neuro post op       Precautions:  N/A  Fall Risk: none       Assessment:  RESPONSE TO TREATMENT: Tolerated tx well without reports of pain or discomfort. Demonstrates good ankle DF in weight bearing but continues to lack range in non weight bearing position. Added hip strengthening and SL activities today with good tolerance.     CONTINUED NEED FOR SKILLED CARE: Patient needs continued work on/skilled PT for: s/p peroneal nerve decompression to address remaining functional, objective and subjective deficits to allow them to return to prior /optimal level of function with ADLs.      PROGRESS TOWARDS GOALS: no pain or discomfort with any exercises on this date       Plan:  Progress DF, ev ROM and strengthening, jumping and cutting motions       Subjective:   Patient reports that he feels good coming in today. Reports that he has no had pain since initial visit.    Pain (0-10): 0   HEP adherence / understanding: going well, no concerns.       Objective:  Decreased R ankle DF and eversion         Treatment Performed: (\"NP\" = Not Performed)       Therapeutic Exercise:________________  30 minutes  NuStep x 5 min L4  Standing heel raises x10, eccentric heel raises x10 on R  Standing three way hip red TB x 10 ea B  Side " stepping in semi squat red TB around knees x 3 laps  6in tap down 2x10 on R  Three way matrix taps 2x5 rounds on R  6in DF stretch on step 10x10s hold   Seated toe yoga x 2 min   Seated towel scrunches x 2 min   Seated toe raises 2x10     Manual Therapy:     5 minutes  Talocrural joint mob A-P   Ankle distraction in supine   Passive ankle DF     Neuromuscular Re-education:      minutes      Gait Training:            minutes      Aquatics:            minutes      Therapeutic Activity:      minutes      Gait Training:            minutes      Modalities:       Vasopneumatic Device       minutes  Electrical Stimulation          minutes  IFC to peroneals for 10 min at 28V,    Ultrasound            minutes  Iontophoresis                     minutes  Cold Pack            minutes  Mechanical Traction           minutes    Self Care Home Management:    minutes      Education:         minutes            Re-Evaluation:   minutes

## 2025-08-08 ENCOUNTER — TREATMENT (OUTPATIENT)
Dept: PHYSICAL THERAPY | Facility: CLINIC | Age: 40
End: 2025-08-08
Payer: COMMERCIAL

## 2025-08-08 DIAGNOSIS — M67.40 GANGLION CYST: ICD-10-CM

## 2025-08-08 DIAGNOSIS — G57.31 PERONEAL NEUROPATHY, RIGHT: ICD-10-CM

## 2025-08-08 PROCEDURE — 97110 THERAPEUTIC EXERCISES: CPT | Mod: GP

## 2025-08-08 PROCEDURE — 97032 APPL MODALITY 1+ESTIM EA 15: CPT | Mod: GP

## 2025-08-08 NOTE — PROGRESS NOTES
Physical Therapy Treatment    Patient Name: David Cummins  MRN: [id number]  YOB: 1985  Encounter Date: 8/8/2025    Time Entry:  Time Calculation  Start Time: 0802  Stop Time: 0847  Time Calculation (min): 45 min     PT Therapeutic Procedures Time Entry  Therapeutic Exercise Time Entry: 35  PT Modalities Time Entry  E-Stim (Attended) Time Entry: 10                Rehab Insurance Information:   Visit Count: 3  Auth Required: No             Rehab Falls Risk Assessment:  Fall Risk Indicated: No      Problem List Items Addressed This Visit           ICD-10-CM    Peroneal neuropathy, right G57.31    Ganglion cyst M67.40            Subjective   Pt reports feeling good coming in today. has not tried running yet but has been walking on a treadmill at an incline and feels good with this..  Pain reported as 0.           Objective          Ankle/Foot Range of Motion   Right Ankle/Foot   Active (deg) Passive (deg) Pain   Dorsiflexion (KE) 4       Dorsiflexion (KF)         Plantar Flexion         Ankle Inversion         Ankle Eversion         Subtalar Inversion         Subtalar Eversion         Great Toe MTP Flexion         Great Toe MTP Extension         Great Toe IP Flexion                       Activities   Therapeutic Exercise  Therapeutic Exercise Performed: Yes  Therapeutic Exercise Activity 1: NuStep x 5 min L3  Therapeutic Exercise Activity 2: standing three way hip green TB x 10 ea B  Therapeutic Exercise Activity 3: side stepping semi-squat green TB x 3 laps  Therapeutic Exercise Activity 4: BAPS DF/PF, inv/ev x 20 ea R fwd  Therapeutic Exercise Activity 5: 8in tap downs lateral 2x10 B  Therapeutic Exercise Activity 6: three way matrix taps with airex 2x5 rounds B  Therapeutic Exercise Activity 7: runners step up x 10 B  Therapeutic Exercise Activity 8: runners step up with reverse lunge 2 x 10 B  Therapeutic Exercise Activity 9: leg press SL x 100# on R 2x10  Therapeutic Exercise Activity 10: fwd/back,  side to side hops over line x 20 ea                   Modalities  Modalities Used: Yes  Modality 1: Timed AZIZA - Electric Stimulation Attended (IFC to R peroneals x 10 min, , 26V)                                        Assessment/Plan   Assessment: Pt tolerated tx well without reports of pain or discomfort. Pt demonstrates mild instability on airex and with BAPS board but able to complete. Discussed with patient the ability to return to jogging slowly while monitoring symptoms. Patient tolerated treatment well      Plan: Progress strengthening and balance training, trial jogging/running program

## 2025-08-15 ENCOUNTER — TREATMENT (OUTPATIENT)
Dept: PHYSICAL THERAPY | Facility: CLINIC | Age: 40
End: 2025-08-15
Payer: COMMERCIAL

## 2025-08-15 DIAGNOSIS — G57.31 PERONEAL NEUROPATHY, RIGHT: ICD-10-CM

## 2025-08-15 DIAGNOSIS — M67.40 GANGLION CYST: ICD-10-CM

## 2025-08-15 PROCEDURE — 97140 MANUAL THERAPY 1/> REGIONS: CPT | Mod: GP

## 2025-08-15 PROCEDURE — 97110 THERAPEUTIC EXERCISES: CPT | Mod: GP

## 2025-08-21 ENCOUNTER — APPOINTMENT (OUTPATIENT)
Dept: ORTHOPEDIC SURGERY | Facility: CLINIC | Age: 40
End: 2025-08-21
Payer: COMMERCIAL

## 2025-08-22 ENCOUNTER — TREATMENT (OUTPATIENT)
Dept: PHYSICAL THERAPY | Facility: CLINIC | Age: 40
End: 2025-08-22
Payer: COMMERCIAL

## 2025-08-22 DIAGNOSIS — M67.40 GANGLION CYST: ICD-10-CM

## 2025-08-22 DIAGNOSIS — G57.31 PERONEAL NEUROPATHY, RIGHT: ICD-10-CM

## 2025-08-22 PROCEDURE — 97110 THERAPEUTIC EXERCISES: CPT | Mod: GP,CQ

## 2025-08-22 PROCEDURE — 97140 MANUAL THERAPY 1/> REGIONS: CPT | Mod: GP,CQ

## 2025-08-22 PROCEDURE — 97014 ELECTRIC STIMULATION THERAPY: CPT | Mod: GP,CQ

## 2025-08-29 ENCOUNTER — TREATMENT (OUTPATIENT)
Dept: PHYSICAL THERAPY | Facility: CLINIC | Age: 40
End: 2025-08-29
Payer: COMMERCIAL

## 2025-08-29 DIAGNOSIS — M67.40 GANGLION CYST: ICD-10-CM

## 2025-08-29 DIAGNOSIS — G57.31 PERONEAL NEUROPATHY, RIGHT: ICD-10-CM

## 2025-08-29 PROCEDURE — 97110 THERAPEUTIC EXERCISES: CPT | Mod: GP,CQ

## 2025-08-29 PROCEDURE — 97014 ELECTRIC STIMULATION THERAPY: CPT | Mod: GP,CQ

## 2025-09-05 ENCOUNTER — TREATMENT (OUTPATIENT)
Dept: PHYSICAL THERAPY | Facility: CLINIC | Age: 40
End: 2025-09-05
Payer: COMMERCIAL

## 2025-09-05 DIAGNOSIS — G57.31 PERONEAL NEUROPATHY, RIGHT: Primary | ICD-10-CM

## 2025-09-05 DIAGNOSIS — M67.40 GANGLION CYST: ICD-10-CM

## 2025-09-05 PROCEDURE — 97530 THERAPEUTIC ACTIVITIES: CPT | Mod: GP

## 2025-09-05 PROCEDURE — 97110 THERAPEUTIC EXERCISES: CPT | Mod: GP

## 2025-09-16 ENCOUNTER — APPOINTMENT (OUTPATIENT)
Dept: NEUROSURGERY | Facility: CLINIC | Age: 40
End: 2025-09-16
Payer: COMMERCIAL

## 2025-09-23 ENCOUNTER — APPOINTMENT (OUTPATIENT)
Dept: NEUROSURGERY | Facility: CLINIC | Age: 40
End: 2025-09-23
Payer: COMMERCIAL

## 2025-11-06 ENCOUNTER — APPOINTMENT (OUTPATIENT)
Dept: ORTHOPEDIC SURGERY | Facility: CLINIC | Age: 40
End: 2025-11-06
Payer: COMMERCIAL

## (undated) DEVICE — TUBING, SMOKE EVAC, 3/8 X 10 FT

## (undated) DEVICE — ADHESIVE, SKIN, DERMABOND ADVANCED, 15CM, PEN-STYLE

## (undated) DEVICE — PROBE, STIMULATOR, MONOPOLAR, FLUSH TIP, PRASS, W/HANDLE, STANDARD

## (undated) DEVICE — WOUND SYSTEM, DEBRIDEMENT & CLEANING, O.R DUOPAK

## (undated) DEVICE — SPEAR, EYE, SURGICAL, WECK-CELL, CELLULOSE

## (undated) DEVICE — CLIP, LIGATING, HORIZON, WIDE SLOT, SMALL, TITANIUM

## (undated) DEVICE — Device

## (undated) DEVICE — BAG, PLASTIC, 10 X 17 IN

## (undated) DEVICE — DRAPE, SHEET, THYROID, W/ARMBOARD COVER, 100 X 121 IN, DISPOSABLE, LF, STERILE

## (undated) DEVICE — MANIFOLD, 4 PORT NEPTUNE STANDARD

## (undated) DEVICE — PAD, GROUNDING, ELECTROSURGICAL, W/9 FT CABLE, POLYHESIVE II, ADULT, LF

## (undated) DEVICE — DRAIN, PENROSE, 0.25 X 18 IN, LATEX, STERILE

## (undated) DEVICE — DRAPE, SMARTDRAPE, FOR TIVATO MICROSCOPE

## (undated) DEVICE — CLIP, LIGATING, HORIZON, MEDIUM, TITANIUM

## (undated) DEVICE — STAY SET, SURGICAL, 12MM BLUNT HOOK, 8/PK

## (undated) DEVICE — NEEDLE, ELECTRODE, SUBDERMAL, PAIRED, 2.0 LEAD, DISP

## (undated) DEVICE — SUTURE, MONOCRYL, 4-0, 18 IN, PS2, UNDYED

## (undated) DEVICE — ELECTRODE, GROUND PLATE

## (undated) DEVICE — BANDAGE, GAUZE, CONFORMING, KERLIX, 6 PLY, 4.5 IN X 4.1 YD

## (undated) DEVICE — LOOP, VESSEL, MAXI, RED

## (undated) DEVICE — STAY SET, SURGICAL, 5MM SHARP HOOK, 8PK

## (undated) DEVICE — LOOP, VESSEL, MINI, WHITE

## (undated) DEVICE — COVER, CART, 45 X 27 X 48 IN, CLEAR

## (undated) DEVICE — APPLICATOR, CHLORAPREP, W/ORANGE TINT, 26ML

## (undated) DEVICE — DRAPE, SHEET, EXTREMITY, W/ARM BOARD COVERS, 87 X 106 X 128 IN, DISPOSABLE, LF, STERILE

## (undated) DEVICE — ELECTRODE, CORKSCREW NEEDLE 1.5M LENGTH

## (undated) DEVICE — CONTAINER, SPECIMEN, 120 ML, STERILE

## (undated) DEVICE — DRAPE, SHEET, FAN FOLDED, HALF, 44 X 58 IN, DISPOSABLE, LF, STERILE

## (undated) DEVICE — TUBING, SUCTION, MICROSURGICAL, MICROVAC, 5 FR

## (undated) DEVICE — SUTURE, SILK, 2-0, 18 IN, BLACK

## (undated) DEVICE — SEALANT, HEMOSTATIC, FLOSEAL, 10 ML

## (undated) DEVICE — SUTURE, VICRYL, 3-0,18 IN, SH, UNDYED

## (undated) DEVICE — DRESSING, NON-ADHERENT, TELFA, OUCHLESS, 3 X 8 IN, STERILE

## (undated) DEVICE — LOOP, VESSEL, MAXI, BLUE

## (undated) DEVICE — DRAPE, TIBURON, SPLIT SHEET, REINF ADH STRIP, 77X122